# Patient Record
Sex: FEMALE | Race: WHITE | NOT HISPANIC OR LATINO | Employment: FULL TIME | URBAN - NONMETROPOLITAN AREA
[De-identification: names, ages, dates, MRNs, and addresses within clinical notes are randomized per-mention and may not be internally consistent; named-entity substitution may affect disease eponyms.]

---

## 2018-05-25 ENCOUNTER — TRANSCRIBE ORDERS (OUTPATIENT)
Dept: ADMINISTRATIVE | Facility: HOSPITAL | Age: 33
End: 2018-05-25

## 2018-05-25 DIAGNOSIS — N64.4 BREAST PAIN, LEFT: Primary | ICD-10-CM

## 2018-06-05 ENCOUNTER — APPOINTMENT (OUTPATIENT)
Dept: MAMMOGRAPHY | Facility: HOSPITAL | Age: 33
End: 2018-06-05

## 2018-06-11 ENCOUNTER — HOSPITAL ENCOUNTER (OUTPATIENT)
Dept: MAMMOGRAPHY | Facility: HOSPITAL | Age: 33
Discharge: HOME | End: 2018-06-11
Admitting: OBSTETRICS & GYNECOLOGY

## 2018-06-11 DIAGNOSIS — N64.4 BREAST PAIN, LEFT: ICD-10-CM

## 2018-06-11 PROCEDURE — G0279 TOMOSYNTHESIS, MAMMO: HCPCS

## 2018-06-11 PROCEDURE — 77066 DX MAMMO INCL CAD BI: CPT

## 2019-01-14 ENCOUNTER — TRANSCRIBE ORDERS (OUTPATIENT)
Dept: ADMINISTRATIVE | Facility: HOSPITAL | Age: 34
End: 2019-01-14

## 2019-01-14 DIAGNOSIS — Z09 FOLLOW-UP EXAM, 3-6 MONTHS SINCE PREVIOUS EXAM: Primary | ICD-10-CM

## 2019-01-22 ENCOUNTER — APPOINTMENT (OUTPATIENT)
Dept: MAMMOGRAPHY | Facility: HOSPITAL | Age: 34
End: 2019-01-22

## 2019-04-19 ENCOUNTER — HOSPITAL ENCOUNTER (OUTPATIENT)
Dept: MAMMOGRAPHY | Facility: HOSPITAL | Age: 34
Discharge: HOME | End: 2019-04-19
Admitting: OBSTETRICS & GYNECOLOGY

## 2019-04-19 DIAGNOSIS — R92.8 FOLLOW-UP EXAMINATION OF ABNORMAL MAMMOGRAM: ICD-10-CM

## 2019-04-19 PROCEDURE — G0279 TOMOSYNTHESIS, MAMMO: HCPCS

## 2019-04-19 PROCEDURE — 77065 DX MAMMO INCL CAD UNI: CPT

## 2020-01-21 ENCOUNTER — OFFICE VISIT (OUTPATIENT)
Dept: RETAIL CLINIC | Facility: CLINIC | Age: 35
End: 2020-01-21

## 2020-01-21 VITALS
TEMPERATURE: 101.3 F | SYSTOLIC BLOOD PRESSURE: 118 MMHG | RESPIRATION RATE: 16 BRPM | DIASTOLIC BLOOD PRESSURE: 82 MMHG | HEART RATE: 116 BPM | WEIGHT: 109 LBS | OXYGEN SATURATION: 98 %

## 2020-01-21 DIAGNOSIS — J11.1 INFLUENZA-LIKE ILLNESS: Primary | ICD-10-CM

## 2020-01-21 LAB
EXPIRATION DATE: NORMAL
FLUAV AG NPH QL: NEGATIVE
FLUBV AG NPH QL: NEGATIVE
INTERNAL CONTROL: NORMAL
Lab: NORMAL

## 2020-01-21 PROCEDURE — 87804 INFLUENZA ASSAY W/OPTIC: CPT | Performed by: NURSE PRACTITIONER

## 2020-01-21 PROCEDURE — 99203 OFFICE O/P NEW LOW 30 MIN: CPT | Performed by: NURSE PRACTITIONER

## 2020-01-21 RX ORDER — OSELTAMIVIR PHOSPHATE 75 MG/1
75 CAPSULE ORAL 2 TIMES DAILY
Qty: 10 CAPSULE | Refills: 0 | Status: SHIPPED | OUTPATIENT
Start: 2020-01-21 | End: 2020-01-26

## 2020-01-21 NOTE — PROGRESS NOTES
Subjective   Dwayne Saucedo is a 34 y.o. female.     URI    This is a new problem. The current episode started yesterday. The problem has been unchanged. Maximum temperature: up to 101.5. Associated symptoms include congestion, coughing, headaches, nausea, rhinorrhea and a sore throat. Pertinent negatives include no diarrhea, ear pain or vomiting. She has tried nothing for the symptoms.        No current outpatient medications on file prior to visit.     No current facility-administered medications on file prior to visit.        Allergies   Allergen Reactions   • Contrast Dye Hives   • Sumatriptan Other (See Comments)     Chest pain       Past Medical History:   Diagnosis Date   • Migraines        Past Surgical History:   Procedure Laterality Date   • ENDOMETRIAL BIOPSY     • TONSILLECTOMY         History reviewed. No pertinent family history.    Social History     Socioeconomic History   • Marital status:      Spouse name: Not on file   • Number of children: Not on file   • Years of education: Not on file   • Highest education level: Not on file   Tobacco Use   • Smoking status: Never Smoker   • Smokeless tobacco: Never Used   Substance and Sexual Activity   • Alcohol use: Never     Frequency: Never   • Drug use: Never   • Sexual activity: Defer       Review of Systems   Constitutional: Positive for activity change, appetite change, chills, diaphoresis and fever.   HENT: Positive for congestion, rhinorrhea and sore throat. Negative for ear pain.    Respiratory: Positive for cough.    Gastrointestinal: Positive for nausea. Negative for diarrhea and vomiting.   Neurological: Positive for headaches.       /82   Pulse 116   Temp (!) 101.3 °F (38.5 °C)   Resp 16   Wt 49.4 kg (109 lb)   SpO2 98%   Breastfeeding No     Objective   Physical Exam   Constitutional: She is oriented to person, place, and time. She appears well-developed and well-nourished. She is cooperative.   HENT:   Head:  Normocephalic.   Right Ear: Tympanic membrane, external ear and ear canal normal.   Left Ear: Tympanic membrane, external ear and ear canal normal.   Nose: Rhinorrhea present. Right sinus exhibits no maxillary sinus tenderness and no frontal sinus tenderness. Left sinus exhibits no maxillary sinus tenderness and no frontal sinus tenderness.   Mouth/Throat: Uvula is midline, oropharynx is clear and moist and mucous membranes are normal. Tonsils are 0 on the right. Tonsils are 0 on the left.   Eyes: Conjunctivae, EOM and lids are normal.   Cardiovascular: Regular rhythm and normal heart sounds. Tachycardia present.   Pulmonary/Chest: Effort normal and breath sounds normal. No accessory muscle usage. No respiratory distress.   Lymphadenopathy:     She has no cervical adenopathy.   Neurological: She is alert and oriented to person, place, and time.   Skin: Skin is warm, dry and intact.   Psychiatric: She has a normal mood and affect. Her speech is normal and behavior is normal.         Assessment/Plan   Diagnoses and all orders for this visit:    Influenza-like illness  -     POCT Influenza A/B  -     oseltamivir (TAMIFLU) 75 MG capsule; Take 1 capsule by mouth 2 (Two) Times a Day for 5 days.        Results for orders placed or performed in visit on 01/21/20   POCT Influenza A/B   Result Value Ref Range    Rapid Influenza A Ag Negative Negative    Rapid Influenza B Ag Negative Negative    Internal Control Passed Passed    Lot Number [id number]     Expiration Date 6/21      Patient declined anti-pyretic in clinic. States she will take at home.   Alternate tylenol/motrin for pain/fever. Increase fluids. Rest.   Follow up with PCP or go to the nearest emergency room if symptoms worsen or fail to improve.

## 2020-12-29 ENCOUNTER — APPOINTMENT (OUTPATIENT)
Dept: CT IMAGING | Facility: HOSPITAL | Age: 35
End: 2020-12-29

## 2020-12-29 ENCOUNTER — HOSPITAL ENCOUNTER (EMERGENCY)
Facility: HOSPITAL | Age: 35
Discharge: HOME | End: 2020-12-29
Attending: EMERGENCY MEDICINE | Admitting: EMERGENCY MEDICINE

## 2020-12-29 ENCOUNTER — APPOINTMENT (OUTPATIENT)
Dept: GENERAL RADIOLOGY | Facility: HOSPITAL | Age: 35
End: 2020-12-29

## 2020-12-29 VITALS
BODY MASS INDEX: 20.06 KG/M2 | OXYGEN SATURATION: 97 % | HEIGHT: 63 IN | HEART RATE: 72 BPM | SYSTOLIC BLOOD PRESSURE: 105 MMHG | WEIGHT: 113.2 LBS | DIASTOLIC BLOOD PRESSURE: 60 MMHG | TEMPERATURE: 98.6 F | RESPIRATION RATE: 17 BRPM

## 2020-12-29 DIAGNOSIS — R10.84 GENERALIZED ABDOMINAL PAIN: Primary | ICD-10-CM

## 2020-12-29 LAB
ALBUMIN SERPL-MCNC: 4.7 G/DL (ref 3.5–5.2)
ALBUMIN/GLOB SERPL: 1.7 G/DL
ALP SERPL-CCNC: 49 U/L (ref 39–117)
ALT SERPL W P-5'-P-CCNC: 9 U/L (ref 1–33)
ANION GAP SERPL CALCULATED.3IONS-SCNC: 10.2 MMOL/L (ref 5–15)
AST SERPL-CCNC: 18 U/L (ref 1–32)
B-HCG UR QL: NEGATIVE
BACTERIA UR QL AUTO: ABNORMAL /HPF
BASOPHILS # BLD AUTO: 0.04 10*3/MM3 (ref 0–0.2)
BASOPHILS NFR BLD AUTO: 0.7 % (ref 0–1.5)
BILIRUB SERPL-MCNC: 0.6 MG/DL (ref 0–1.2)
BILIRUB UR QL STRIP: NEGATIVE
BUN SERPL-MCNC: 8 MG/DL (ref 6–20)
BUN/CREAT SERPL: 11.8 (ref 7–25)
CALCIUM SPEC-SCNC: 9.4 MG/DL (ref 8.6–10.5)
CHLORIDE SERPL-SCNC: 106 MMOL/L (ref 98–107)
CLARITY UR: ABNORMAL
CO2 SERPL-SCNC: 23.8 MMOL/L (ref 22–29)
COLOR UR: YELLOW
CREAT SERPL-MCNC: 0.68 MG/DL (ref 0.57–1)
DEPRECATED RDW RBC AUTO: 40.5 FL (ref 37–54)
EOSINOPHIL # BLD AUTO: 0.05 10*3/MM3 (ref 0–0.4)
EOSINOPHIL NFR BLD AUTO: 0.9 % (ref 0.3–6.2)
ERYTHROCYTE [DISTWIDTH] IN BLOOD BY AUTOMATED COUNT: 12.3 % (ref 12.3–15.4)
GFR SERPL CREATININE-BSD FRML MDRD: 98 ML/MIN/1.73
GLOBULIN UR ELPH-MCNC: 2.7 GM/DL
GLUCOSE SERPL-MCNC: 89 MG/DL (ref 65–99)
GLUCOSE UR STRIP-MCNC: NEGATIVE MG/DL
HCT VFR BLD AUTO: 40.6 % (ref 34–46.6)
HGB BLD-MCNC: 13.3 G/DL (ref 12–15.9)
HGB UR QL STRIP.AUTO: ABNORMAL
HOLD SPECIMEN: NORMAL
HOLD SPECIMEN: NORMAL
HYALINE CASTS UR QL AUTO: ABNORMAL /LPF
IMM GRANULOCYTES # BLD AUTO: 0.01 10*3/MM3 (ref 0–0.05)
IMM GRANULOCYTES NFR BLD AUTO: 0.2 % (ref 0–0.5)
KETONES UR QL STRIP: NEGATIVE
LEUKOCYTE ESTERASE UR QL STRIP.AUTO: ABNORMAL
LIPASE SERPL-CCNC: 20 U/L (ref 13–60)
LYMPHOCYTES # BLD AUTO: 1.52 10*3/MM3 (ref 0.7–3.1)
LYMPHOCYTES NFR BLD AUTO: 27.4 % (ref 19.6–45.3)
MCH RBC QN AUTO: 29.5 PG (ref 26.6–33)
MCHC RBC AUTO-ENTMCNC: 32.8 G/DL (ref 31.5–35.7)
MCV RBC AUTO: 90 FL (ref 79–97)
MONOCYTES # BLD AUTO: 0.27 10*3/MM3 (ref 0.1–0.9)
MONOCYTES NFR BLD AUTO: 4.9 % (ref 5–12)
NEUTROPHILS NFR BLD AUTO: 3.66 10*3/MM3 (ref 1.7–7)
NEUTROPHILS NFR BLD AUTO: 65.9 % (ref 42.7–76)
NITRITE UR QL STRIP: NEGATIVE
NRBC BLD AUTO-RTO: 0 /100 WBC (ref 0–0.2)
PH UR STRIP.AUTO: 7.5 [PH] (ref 5–8)
PLATELET # BLD AUTO: 153 10*3/MM3 (ref 140–450)
PMV BLD AUTO: 11.1 FL (ref 6–12)
POTASSIUM SERPL-SCNC: 3.6 MMOL/L (ref 3.5–5.2)
PROT SERPL-MCNC: 7.4 G/DL (ref 6–8.5)
PROT UR QL STRIP: NEGATIVE
RBC # BLD AUTO: 4.51 10*6/MM3 (ref 3.77–5.28)
RBC # UR: ABNORMAL /HPF
REF LAB TEST METHOD: ABNORMAL
SODIUM SERPL-SCNC: 140 MMOL/L (ref 136–145)
SP GR UR STRIP: 1.02 (ref 1–1.03)
SQUAMOUS #/AREA URNS HPF: ABNORMAL /HPF
TROPONIN T SERPL-MCNC: <0.01 NG/ML (ref 0–0.03)
UROBILINOGEN UR QL STRIP: ABNORMAL
WBC # BLD AUTO: 5.55 10*3/MM3 (ref 3.4–10.8)
WBC UR QL AUTO: ABNORMAL /HPF
WHOLE BLOOD HOLD SPECIMEN: NORMAL
WHOLE BLOOD HOLD SPECIMEN: NORMAL

## 2020-12-29 PROCEDURE — 80053 COMPREHEN METABOLIC PANEL: CPT

## 2020-12-29 PROCEDURE — 84484 ASSAY OF TROPONIN QUANT: CPT

## 2020-12-29 PROCEDURE — 71046 X-RAY EXAM CHEST 2 VIEWS: CPT

## 2020-12-29 PROCEDURE — 85025 COMPLETE CBC W/AUTO DIFF WBC: CPT

## 2020-12-29 PROCEDURE — 99284 EMERGENCY DEPT VISIT MOD MDM: CPT

## 2020-12-29 PROCEDURE — 81025 URINE PREGNANCY TEST: CPT | Performed by: PHYSICIAN ASSISTANT

## 2020-12-29 PROCEDURE — 81001 URINALYSIS AUTO W/SCOPE: CPT

## 2020-12-29 PROCEDURE — 83690 ASSAY OF LIPASE: CPT

## 2020-12-29 PROCEDURE — 93005 ELECTROCARDIOGRAM TRACING: CPT

## 2020-12-29 PROCEDURE — 96374 THER/PROPH/DIAG INJ IV PUSH: CPT

## 2020-12-29 PROCEDURE — 25010000002 ONDANSETRON PER 1 MG: Performed by: PHYSICIAN ASSISTANT

## 2020-12-29 PROCEDURE — 74176 CT ABD & PELVIS W/O CONTRAST: CPT

## 2020-12-29 RX ORDER — ONDANSETRON 2 MG/ML
4 INJECTION INTRAMUSCULAR; INTRAVENOUS ONCE
Status: COMPLETED | OUTPATIENT
Start: 2020-12-29 | End: 2020-12-29

## 2020-12-29 RX ORDER — ONDANSETRON 4 MG/1
4 TABLET, ORALLY DISINTEGRATING ORAL EVERY 6 HOURS PRN
Qty: 8 TABLET | Refills: 0 | Status: SHIPPED | OUTPATIENT
Start: 2020-12-29 | End: 2020-12-31

## 2020-12-29 RX ORDER — ACETAMINOPHEN 500 MG
500 TABLET ORAL EVERY 6 HOURS PRN
COMMUNITY

## 2020-12-29 RX ORDER — FAMOTIDINE 40 MG/1
40 TABLET, FILM COATED ORAL DAILY
Qty: 14 TABLET | Refills: 0 | Status: SHIPPED | OUTPATIENT
Start: 2020-12-29 | End: 2021-01-12

## 2020-12-29 RX ORDER — SODIUM CHLORIDE 0.9 % (FLUSH) 0.9 %
10 SYRINGE (ML) INJECTION AS NEEDED
Status: DISCONTINUED | OUTPATIENT
Start: 2020-12-29 | End: 2020-12-29 | Stop reason: HOSPADM

## 2020-12-29 RX ADMIN — LIDOCAINE HYDROCHLORIDE: 20 SOLUTION ORAL; TOPICAL at 17:18

## 2020-12-29 RX ADMIN — ONDANSETRON 4 MG: 2 INJECTION INTRAMUSCULAR; INTRAVENOUS at 17:15

## 2020-12-29 RX ADMIN — SODIUM CHLORIDE 1000 ML: 9 INJECTION, SOLUTION INTRAVENOUS at 17:14

## 2023-03-17 ENCOUNTER — LAB (OUTPATIENT)
Dept: LAB | Facility: HOSPITAL | Age: 38
End: 2023-03-17
Payer: COMMERCIAL

## 2023-03-17 DIAGNOSIS — E83.10 DISORDER OF IRON METABOLISM: ICD-10-CM

## 2023-03-17 DIAGNOSIS — Z00.00 ROUTINE GENERAL MEDICAL EXAMINATION AT A HEALTH CARE FACILITY: ICD-10-CM

## 2023-03-17 DIAGNOSIS — E55.9 VITAMIN D DEFICIENCY DISEASE: ICD-10-CM

## 2023-03-17 LAB
ALBUMIN SERPL-MCNC: 4.8 G/DL (ref 3.5–5.2)
ALBUMIN/GLOB SERPL: 2 G/DL
ALP SERPL-CCNC: 55 U/L (ref 39–117)
ALT SERPL W P-5'-P-CCNC: 9 U/L (ref 1–33)
ANION GAP SERPL CALCULATED.3IONS-SCNC: 9 MMOL/L (ref 5–15)
AST SERPL-CCNC: 18 U/L (ref 1–32)
BILIRUB SERPL-MCNC: 0.5 MG/DL (ref 0–1.2)
BUN SERPL-MCNC: 15 MG/DL (ref 6–20)
BUN/CREAT SERPL: 14.2 (ref 7–25)
CALCIUM SPEC-SCNC: 9.3 MG/DL (ref 8.6–10.5)
CHLORIDE SERPL-SCNC: 107 MMOL/L (ref 98–107)
CO2 SERPL-SCNC: 28 MMOL/L (ref 22–29)
CREAT SERPL-MCNC: 1.06 MG/DL (ref 0.57–1)
DEPRECATED RDW RBC AUTO: 39.8 FL (ref 37–54)
EGFRCR SERPLBLD CKD-EPI 2021: 69.1 ML/MIN/1.73
ERYTHROCYTE [DISTWIDTH] IN BLOOD BY AUTOMATED COUNT: 12.2 % (ref 12.3–15.4)
FERRITIN SERPL-MCNC: 29.9 NG/ML (ref 13–150)
GLOBULIN UR ELPH-MCNC: 2.4 GM/DL
GLUCOSE SERPL-MCNC: 96 MG/DL (ref 65–99)
HCT VFR BLD AUTO: 39.6 % (ref 34–46.6)
HGB BLD-MCNC: 12.9 G/DL (ref 12–15.9)
IRON 24H UR-MRATE: 126 MCG/DL (ref 37–145)
IRON SATN MFR SERPL: 31 % (ref 20–50)
MCH RBC QN AUTO: 28.9 PG (ref 26.6–33)
MCHC RBC AUTO-ENTMCNC: 32.6 G/DL (ref 31.5–35.7)
MCV RBC AUTO: 88.6 FL (ref 79–97)
PLATELET # BLD AUTO: 161 10*3/MM3 (ref 140–450)
PMV BLD AUTO: 12.6 FL (ref 6–12)
POTASSIUM SERPL-SCNC: 3.9 MMOL/L (ref 3.5–5.2)
PROT SERPL-MCNC: 7.2 G/DL (ref 6–8.5)
RBC # BLD AUTO: 4.47 10*6/MM3 (ref 3.77–5.28)
SODIUM SERPL-SCNC: 144 MMOL/L (ref 136–145)
TIBC SERPL-MCNC: 402 MCG/DL (ref 298–536)
TRANSFERRIN SERPL-MCNC: 270 MG/DL (ref 200–360)
WBC NRBC COR # BLD: 5.03 10*3/MM3 (ref 3.4–10.8)

## 2023-03-17 PROCEDURE — 36415 COLL VENOUS BLD VENIPUNCTURE: CPT

## 2023-03-17 PROCEDURE — 85027 COMPLETE CBC AUTOMATED: CPT

## 2023-03-17 PROCEDURE — 80053 COMPREHEN METABOLIC PANEL: CPT

## 2023-03-17 PROCEDURE — 81256 HFE GENE: CPT

## 2023-03-17 PROCEDURE — 84466 ASSAY OF TRANSFERRIN: CPT

## 2023-03-17 PROCEDURE — 82607 VITAMIN B-12: CPT

## 2023-03-17 PROCEDURE — 82728 ASSAY OF FERRITIN: CPT

## 2023-03-17 PROCEDURE — 82306 VITAMIN D 25 HYDROXY: CPT

## 2023-03-17 PROCEDURE — 83540 ASSAY OF IRON: CPT

## 2023-03-18 LAB
25(OH)D3 SERPL-MCNC: 16.3 NG/ML (ref 30–100)
VIT B12 BLD-MCNC: 264 PG/ML (ref 211–946)

## 2023-03-22 LAB — HFE GENE MUT ANL BLD/T: NORMAL

## 2023-05-18 ENCOUNTER — HOSPITAL ENCOUNTER (OUTPATIENT)
Dept: GENERAL RADIOLOGY | Facility: HOSPITAL | Age: 38
Discharge: HOME | End: 2023-05-18
Payer: COMMERCIAL

## 2023-05-18 DIAGNOSIS — G89.29 CHRONIC BILATERAL LOW BACK PAIN WITHOUT SCIATICA: ICD-10-CM

## 2023-05-18 DIAGNOSIS — M54.50 CHRONIC BILATERAL LOW BACK PAIN WITHOUT SCIATICA: ICD-10-CM

## 2023-05-18 PROCEDURE — 72114 X-RAY EXAM L-S SPINE BENDING: CPT

## 2023-09-28 ENCOUNTER — APPOINTMENT (OUTPATIENT)
Dept: CT IMAGING | Facility: HOSPITAL | Age: 38
End: 2023-09-28
Payer: COMMERCIAL

## 2023-09-28 ENCOUNTER — HOSPITAL ENCOUNTER (EMERGENCY)
Facility: HOSPITAL | Age: 38
Discharge: HOME | End: 2023-09-28
Attending: EMERGENCY MEDICINE
Payer: COMMERCIAL

## 2023-09-28 VITALS
HEIGHT: 63 IN | RESPIRATION RATE: 17 BRPM | BODY MASS INDEX: 20.2 KG/M2 | OXYGEN SATURATION: 100 % | WEIGHT: 114 LBS | HEART RATE: 72 BPM | DIASTOLIC BLOOD PRESSURE: 84 MMHG | TEMPERATURE: 98 F | SYSTOLIC BLOOD PRESSURE: 120 MMHG

## 2023-09-28 DIAGNOSIS — N23 RENAL COLIC ON RIGHT SIDE: Primary | ICD-10-CM

## 2023-09-28 LAB
ALBUMIN SERPL-MCNC: 4.7 G/DL (ref 3.5–5.2)
ALBUMIN/GLOB SERPL: 2 G/DL
ALP SERPL-CCNC: 59 U/L (ref 39–117)
ALT SERPL W P-5'-P-CCNC: 9 U/L (ref 1–33)
ANION GAP SERPL CALCULATED.3IONS-SCNC: 13.5 MMOL/L (ref 5–15)
AST SERPL-CCNC: 18 U/L (ref 1–32)
B-HCG UR QL: NEGATIVE
BACTERIA UR QL AUTO: ABNORMAL /HPF
BASOPHILS # BLD AUTO: 0.01 10*3/MM3 (ref 0–0.2)
BASOPHILS NFR BLD AUTO: 0.2 % (ref 0–1.5)
BILIRUB SERPL-MCNC: 0.7 MG/DL (ref 0–1.2)
BILIRUB UR QL STRIP: NEGATIVE
BUN SERPL-MCNC: 12 MG/DL (ref 6–20)
BUN/CREAT SERPL: 16.2 (ref 7–25)
CALCIUM SPEC-SCNC: 9.3 MG/DL (ref 8.6–10.5)
CHLORIDE SERPL-SCNC: 103 MMOL/L (ref 98–107)
CLARITY UR: ABNORMAL
CO2 SERPL-SCNC: 22.5 MMOL/L (ref 22–29)
COLOR UR: YELLOW
CREAT SERPL-MCNC: 0.74 MG/DL (ref 0.57–1)
DEPRECATED RDW RBC AUTO: 39.4 FL (ref 37–54)
EGFRCR SERPLBLD CKD-EPI 2021: 106.4 ML/MIN/1.73
EOSINOPHIL # BLD AUTO: 0.07 10*3/MM3 (ref 0–0.4)
EOSINOPHIL NFR BLD AUTO: 1.4 % (ref 0.3–6.2)
ERYTHROCYTE [DISTWIDTH] IN BLOOD BY AUTOMATED COUNT: 12.3 % (ref 12.3–15.4)
GLOBULIN UR ELPH-MCNC: 2.3 GM/DL
GLUCOSE SERPL-MCNC: 93 MG/DL (ref 65–99)
GLUCOSE UR STRIP-MCNC: NEGATIVE MG/DL
HCT VFR BLD AUTO: 38.1 % (ref 34–46.6)
HGB BLD-MCNC: 12.8 G/DL (ref 12–15.9)
HGB UR QL STRIP.AUTO: ABNORMAL
HOLD SPECIMEN: NORMAL
HOLD SPECIMEN: NORMAL
HYALINE CASTS UR QL AUTO: ABNORMAL /LPF
IMM GRANULOCYTES # BLD AUTO: 0.01 10*3/MM3 (ref 0–0.05)
IMM GRANULOCYTES NFR BLD AUTO: 0.2 % (ref 0–0.5)
KETONES UR QL STRIP: NEGATIVE
LEUKOCYTE ESTERASE UR QL STRIP.AUTO: ABNORMAL
LIPASE SERPL-CCNC: 20 U/L (ref 13–60)
LYMPHOCYTES # BLD AUTO: 0.88 10*3/MM3 (ref 0.7–3.1)
LYMPHOCYTES NFR BLD AUTO: 17.4 % (ref 19.6–45.3)
MCH RBC QN AUTO: 29.3 PG (ref 26.6–33)
MCHC RBC AUTO-ENTMCNC: 33.6 G/DL (ref 31.5–35.7)
MCV RBC AUTO: 87.2 FL (ref 79–97)
MONOCYTES # BLD AUTO: 0.23 10*3/MM3 (ref 0.1–0.9)
MONOCYTES NFR BLD AUTO: 4.6 % (ref 5–12)
MUCOUS THREADS URNS QL MICRO: ABNORMAL /HPF
NEUTROPHILS NFR BLD AUTO: 3.85 10*3/MM3 (ref 1.7–7)
NEUTROPHILS NFR BLD AUTO: 76.2 % (ref 42.7–76)
NITRITE UR QL STRIP: NEGATIVE
NRBC BLD AUTO-RTO: 0 /100 WBC (ref 0–0.2)
PH UR STRIP.AUTO: 7 [PH] (ref 5–8)
PLATELET # BLD AUTO: 132 10*3/MM3 (ref 140–450)
PMV BLD AUTO: 11.2 FL (ref 6–12)
POTASSIUM SERPL-SCNC: 3.7 MMOL/L (ref 3.5–5.2)
PROT SERPL-MCNC: 7 G/DL (ref 6–8.5)
PROT UR QL STRIP: ABNORMAL
RBC # BLD AUTO: 4.37 10*6/MM3 (ref 3.77–5.28)
RBC # UR STRIP: ABNORMAL /HPF
REF LAB TEST METHOD: ABNORMAL
SODIUM SERPL-SCNC: 139 MMOL/L (ref 136–145)
SP GR UR STRIP: 1.02 (ref 1–1.03)
SQUAMOUS #/AREA URNS HPF: ABNORMAL /HPF
UROBILINOGEN UR QL STRIP: ABNORMAL
WBC # UR STRIP: ABNORMAL /HPF
WBC NRBC COR # BLD: 5.05 10*3/MM3 (ref 3.4–10.8)
WHOLE BLOOD HOLD COAG: NORMAL
WHOLE BLOOD HOLD SPECIMEN: NORMAL

## 2023-09-28 PROCEDURE — 81025 URINE PREGNANCY TEST: CPT | Performed by: EMERGENCY MEDICINE

## 2023-09-28 PROCEDURE — 83690 ASSAY OF LIPASE: CPT | Performed by: EMERGENCY MEDICINE

## 2023-09-28 PROCEDURE — 80053 COMPREHEN METABOLIC PANEL: CPT | Performed by: EMERGENCY MEDICINE

## 2023-09-28 PROCEDURE — 85025 COMPLETE CBC W/AUTO DIFF WBC: CPT | Performed by: EMERGENCY MEDICINE

## 2023-09-28 PROCEDURE — 25010000002 KETOROLAC TROMETHAMINE PER 15 MG: Performed by: EMERGENCY MEDICINE

## 2023-09-28 PROCEDURE — 51798 US URINE CAPACITY MEASURE: CPT

## 2023-09-28 PROCEDURE — 74176 CT ABD & PELVIS W/O CONTRAST: CPT

## 2023-09-28 PROCEDURE — 99284 EMERGENCY DEPT VISIT MOD MDM: CPT

## 2023-09-28 PROCEDURE — 81001 URINALYSIS AUTO W/SCOPE: CPT | Performed by: EMERGENCY MEDICINE

## 2023-09-28 PROCEDURE — 96374 THER/PROPH/DIAG INJ IV PUSH: CPT

## 2023-09-28 RX ORDER — ONDANSETRON 4 MG/1
4 TABLET, ORALLY DISINTEGRATING ORAL EVERY 8 HOURS PRN
Qty: 12 TABLET | Refills: 0 | Status: SHIPPED | OUTPATIENT
Start: 2023-09-28

## 2023-09-28 RX ORDER — PHENAZOPYRIDINE HYDROCHLORIDE 100 MG/1
200 TABLET, FILM COATED ORAL ONCE
Status: COMPLETED | OUTPATIENT
Start: 2023-09-28 | End: 2023-09-28

## 2023-09-28 RX ORDER — SODIUM CHLORIDE 0.9 % (FLUSH) 0.9 %
10 SYRINGE (ML) INJECTION AS NEEDED
Status: DISCONTINUED | OUTPATIENT
Start: 2023-09-28 | End: 2023-09-28 | Stop reason: HOSPADM

## 2023-09-28 RX ORDER — KETOROLAC TROMETHAMINE 10 MG/1
10 TABLET, FILM COATED ORAL EVERY 6 HOURS PRN
Qty: 20 TABLET | Refills: 0 | Status: SHIPPED | OUTPATIENT
Start: 2023-09-28

## 2023-09-28 RX ORDER — TAMSULOSIN HYDROCHLORIDE 0.4 MG/1
1 CAPSULE ORAL NIGHTLY
Qty: 10 CAPSULE | Refills: 0 | Status: SHIPPED | OUTPATIENT
Start: 2023-09-28 | End: 2023-10-10

## 2023-09-28 RX ORDER — KETOROLAC TROMETHAMINE 30 MG/ML
30 INJECTION, SOLUTION INTRAMUSCULAR; INTRAVENOUS ONCE
Status: COMPLETED | OUTPATIENT
Start: 2023-09-28 | End: 2023-09-28

## 2023-09-28 RX ORDER — OXYCODONE HYDROCHLORIDE AND ACETAMINOPHEN 5; 325 MG/1; MG/1
1 TABLET ORAL EVERY 6 HOURS PRN
Qty: 12 TABLET | Refills: 0 | Status: SHIPPED | OUTPATIENT
Start: 2023-09-28

## 2023-09-28 RX ADMIN — KETOROLAC TROMETHAMINE 30 MG: 30 INJECTION, SOLUTION INTRAMUSCULAR; INTRAVENOUS at 10:45

## 2023-09-28 RX ADMIN — PHENAZOPYRIDINE HYDROCHLORIDE 200 MG: 100 TABLET, FILM COATED ORAL at 10:45

## 2023-09-29 ENCOUNTER — TELEPHONE (OUTPATIENT)
Dept: UROLOGY | Facility: CLINIC | Age: 38
End: 2023-09-29

## 2023-09-29 NOTE — TELEPHONE ENCOUNTER
Provider: PENDING REFERRAL     Caller: Dwayne Saucedo    Relationship to Patient: Self     Phone Number: [phone]    Reason for Call: MEDICAL CONCERN    When was the patient last seen: NEW PATIENT    Notes: MS. SAUCEDO CALLED TO REPORT THAT SHE IS NOT PRODUCING AS MUCH URINE AS SHE BELIEVES SHE SHOULD. PATIENT WANTS TO KNOW IF SHE SHOULD LIMIT HER DRINKS.     PLEASE CALL MS. SAUCEDO TO DISCUSS.     ---UNABLE TO WARM TRANSFER.

## 2023-10-03 ENCOUNTER — PATIENT ROUNDING (BHMG ONLY) (OUTPATIENT)
Dept: UROLOGY | Facility: CLINIC | Age: 38
End: 2023-10-03
Payer: COMMERCIAL

## 2023-10-03 ENCOUNTER — OFFICE VISIT (OUTPATIENT)
Dept: UROLOGY | Facility: CLINIC | Age: 38
End: 2023-10-03
Payer: COMMERCIAL

## 2023-10-03 VITALS
DIASTOLIC BLOOD PRESSURE: 86 MMHG | HEIGHT: 63 IN | SYSTOLIC BLOOD PRESSURE: 124 MMHG | BODY MASS INDEX: 20.2 KG/M2 | OXYGEN SATURATION: 96 % | TEMPERATURE: 99.1 F | WEIGHT: 114 LBS | HEART RATE: 75 BPM

## 2023-10-03 DIAGNOSIS — N20.0 KIDNEY STONE: Primary | ICD-10-CM

## 2023-10-03 LAB
BILIRUB BLD-MCNC: NEGATIVE MG/DL
CLARITY, POC: CLEAR
COLOR UR: YELLOW
EXPIRATION DATE: ABNORMAL
GLUCOSE UR STRIP-MCNC: NEGATIVE MG/DL
KETONES UR QL: NEGATIVE
LEUKOCYTE EST, POC: NEGATIVE
Lab: ABNORMAL
NITRITE UR-MCNC: NEGATIVE MG/ML
PH UR: 7 [PH] (ref 5–8)
PROT UR STRIP-MCNC: ABNORMAL MG/DL
RBC # UR STRIP: NEGATIVE /UL
SP GR UR: 1.01 (ref 1–1.03)
UROBILINOGEN UR QL: NORMAL

## 2023-10-03 PROCEDURE — 99204 OFFICE O/P NEW MOD 45 MIN: CPT | Performed by: NURSE PRACTITIONER

## 2023-10-03 PROCEDURE — 81003 URINALYSIS AUTO W/O SCOPE: CPT | Performed by: NURSE PRACTITIONER

## 2023-10-03 RX ORDER — ACETAMINOPHEN 500 MG
500 TABLET ORAL EVERY 6 HOURS PRN
Qty: 56 TABLET | Refills: 0 | Status: SHIPPED | OUTPATIENT
Start: 2023-10-03 | End: 2023-10-17

## 2023-10-03 NOTE — PROGRESS NOTES
Office Visit New Stone     Patient Name: Dwayne Saucedo  : 1985   MRN: [id number]     Chief Complaint: Kidney Stones.    Chief Complaint   Patient presents with    Kidney Stone     New patient       Referring Provider: Martin Silver DO    History of Present Illness: Dwayne Saucedo is a 37 y.o. female who presents today for further management of nephrolithiasis.  female presented to ER and was diagnosed with a 10 mm stone. female is managing symptoms today. No fever, chills, nausea, vomiting, hematuria, flank pain, dysuria currently but will sometimes come in waves  Has been having flank pain since 9/15 that worsened and sent her to the ER on     Stone prevention medications: No    Stone related history  Family history of stones:   no  Renal disease or anatomic abnormality: no  Malabsorptive disease or gastric bypass: yes  Frequent UTI's    yes  Parathyroid disease    no    Dietary Considerations  Soda - 0-1 per day  Fast food - 1-2 per week  Water - 7-8 glasses per day  No Adds salt to foods    Subjective      Review of System:   As noted in HPI      Past Medical History:   Past Medical History:   Diagnosis Date    Migraines        Past Surgical History:   Past Surgical History:   Procedure Laterality Date    ENDOMETRIAL BIOPSY      TONSILLECTOMY         Family History: History reviewed. No pertinent family history.    Social History:   Social History     Socioeconomic History    Marital status:    Tobacco Use    Smoking status: Never    Smokeless tobacco: Never   Vaping Use    Vaping Use: Never used   Substance and Sexual Activity    Alcohol use: Never    Drug use: Never    Sexual activity: Defer       Medications:     Current Outpatient Medications:     tamsulosin (FLOMAX) 0.4 MG capsule 24 hr capsule, Take 1 capsule by mouth Every Night., Disp: 10 capsule, Rfl: 0    ketorolac (TORADOL) 10 MG tablet, Take 1 tablet by mouth Every 6 (Six) Hours As Needed for Moderate Pain.  "(Patient not taking: Reported on 10/3/2023), Disp: 20 tablet, Rfl: 0    ondansetron ODT (ZOFRAN-ODT) 4 MG disintegrating tablet, Place 1 tablet on the tongue Every 8 (Eight) Hours As Needed for Nausea. (Patient not taking: Reported on 10/3/2023), Disp: 12 tablet, Rfl: 0    oxyCODONE-acetaminophen (PERCOCET) 5-325 MG per tablet, Take 1 tablet by mouth Every 6 (Six) Hours As Needed for Severe Pain. (Patient not taking: Reported on 10/3/2023), Disp: 12 tablet, Rfl: 0    Allergies:   Allergies   Allergen Reactions    Contrast Dye (Echo Or Unknown Ct/Mr) Hives    Sumatriptan Other (See Comments)     Chest pain       Objective     Physical Exam:   Vital Signs:   Vitals:    10/03/23 1036   BP: 124/86   BP Location: Left arm   Patient Position: Sitting   Cuff Size: Adult   Pulse: 75   Temp: 99.1 °F (37.3 °C)   TempSrc: Temporal   SpO2: 96%   Weight: 51.7 kg (114 lb)   Height: 160 cm (63\")   PainSc:   4   PainLoc: Back     Body mass index is 20.19 kg/m².     Physical Exam  Constitutional: NAD, WDWN.   Neurological: A + O x 3    Psychiatric:  Normal mood and affect      Labs  Lab Results   Component Value Date    GLUCOSE 93 09/28/2023    BUN 12 09/28/2023    CREATININE 0.74 09/28/2023    EGFRIFNONA 98 12/29/2020    BCR 16.2 09/28/2023    K 3.7 09/28/2023    CO2 22.5 09/28/2023    CALCIUM 9.3 09/28/2023    ALBUMIN 4.7 09/28/2023    AST 18 09/28/2023    ALT 9 09/28/2023       Lab Results   Component Value Date    WBC 5.05 09/28/2023    HGB 12.8 09/28/2023    HCT 38.1 09/28/2023    MCV 87.2 09/28/2023     (L) 09/28/2023       No results found for: LDH, URICACID    Lab Results   Component Value Date    CALCIUM 9.3 09/28/2023       Brief Urine Lab Results  (Last result in the past 365 days)        Color   Clarity   Blood   Leuk Est   Nitrite   Protein   CREAT   Urine HCG        10/03/23 1040 Yellow   Clear   Negative   Negative   Negative   Trace                   No results found for: URINECX    )No components found for: " STONEANALYSI      Radiologic Studies  CT Abdomen Pelvis Without Contrast    Result Date: 9/24/2023  1. There is a 4.5 mm stone in the lower pole of the right kidney. There is no hydronephrosis. There are no stones in the left kidney. 2. There is increased gas and stool in the colon with right side constipation. 3. The appendix is not visualized with confidence. There are no secondary findings of appendicitis.    XR Chest 1 View    Result Date: 9/24/2023  No acute cardiopulmonary process Images reviewed, interpreted, and dictated by Dr. Grady Peña. Transcribed by Greg Modi.    CT Abdomen Pelvis Stone Protocol    Result Date: 9/28/2023  Moderate right hydronephrosis secondary to a 10 mm right distal ureteral stone.  Right nephrolithiasis.   CTDI: 6.71 mGy DLP:309.13 mGy.cm  This report was signed and finalized on 9/28/2023 11:23 AM by Alison Goddard MD.        I have personally reviewed these labs and images.    Assessment / Plan      Assessment  Ms. Saucedo is a 37 y.o. female with 10 mm right distal ureteral stone and a smaller 4.5 mm stone in the lower pole of the right kidney.  UA today is negative for infection.  Patient did not  narcotic pain medicine at pharmacy and reports that she does not want to have that medication in her house.  We discussed managing pain prophylactically with alternating Tylenol and diclofenac she is in agreement and I will send prescriptions.    We had informed discussion about the causes of stones, in the main treatments for nephrolithiasis.  The 3 main surgical treatments for stones are percutaneous nephrolithotomy, ureteroscopy and laser lithotripsy, and shockwave lithotripsy.  Based on patient factors and the stone size and location, I have recommended right URS/LL.  We discussed the risks, benefits, and alternatives to this therapy.  The main risks that we discussed were bleeding, urinary infection, damage to nearby structures, need for further procedures, and cardiopulmonary  complications from anesthesia.  The patient voiced understanding and wished to proceed.     Patient is advised if pain becomes unmanageable, she develops fever, nausea or vomiting to return to the ER    Plan  1. Schedule for Right URS/HLL stent at [facility] with Dr. Hdz  2.  Tylenol and diclofenac to manage pain  3.  Continue Flomax daily    Surgical concerns: Suspected Chrohn's disease      LEEANNA Salazar, NP-C  [facility] [place]    Terbinafine Pregnancy And Lactation Text: This medication is Pregnancy Category B and is considered safe during pregnancy. It is also excreted in breast milk and breast feeding isn't recommended.

## 2023-10-06 NOTE — PROGRESS NOTES
October 6, 2023    Hello, may I speak with Dwayne Saucedo? Unable to reach patient.    My name is Valentine.      I am  with [facility]  [address]  [address]  [facility] [address] [phone].    Before we get started may I verify your date of birth? 1985    I am calling to officially welcome you to our practice and ask about your recent visit. Is this a good time to talk?     Tell me about your visit with us. What things went well?         We're always looking for ways to make our patients' experiences even better. Do you have recommendations on ways we may improve?      Overall were you satisfied with your first visit to our practice?        I appreciate you taking the time to speak with me today. Is there anything else I can do for you?       Thank you, and have a great day.

## 2023-10-10 ENCOUNTER — LAB REQUISITION (OUTPATIENT)
Dept: LAB | Facility: HOSPITAL | Age: 38
End: 2023-10-10
Payer: COMMERCIAL

## 2023-10-10 ENCOUNTER — OUTSIDE FACILITY SERVICE (OUTPATIENT)
Dept: UROLOGY | Facility: CLINIC | Age: 38
End: 2023-10-10
Payer: COMMERCIAL

## 2023-10-10 DIAGNOSIS — N20.0 KIDNEY STONE: Primary | ICD-10-CM

## 2023-10-10 PROCEDURE — 82365 CALCULUS SPECTROSCOPY: CPT | Performed by: UROLOGY

## 2023-10-10 RX ORDER — CIPROFLOXACIN 500 MG/1
500 TABLET, FILM COATED ORAL 2 TIMES DAILY
Qty: 6 TABLET | Refills: 0 | Status: SHIPPED | OUTPATIENT
Start: 2023-10-10 | End: 2023-10-13 | Stop reason: SDUPTHER

## 2023-10-10 RX ORDER — OXYCODONE HYDROCHLORIDE 5 MG/1
5 TABLET ORAL EVERY 6 HOURS PRN
Qty: 5 TABLET | Refills: 0 | Status: SHIPPED | OUTPATIENT
Start: 2023-10-10

## 2023-10-10 RX ORDER — ACETAMINOPHEN 500 MG
1000 TABLET ORAL EVERY 6 HOURS
Qty: 30 TABLET | Refills: 0 | Status: SHIPPED | OUTPATIENT
Start: 2023-10-10 | End: 2023-10-13

## 2023-10-10 RX ORDER — DOCUSATE SODIUM 100 MG/1
100 CAPSULE, LIQUID FILLED ORAL 2 TIMES DAILY
Qty: 15 CAPSULE | Refills: 1 | Status: SHIPPED | OUTPATIENT
Start: 2023-10-10

## 2023-10-10 RX ORDER — TAMSULOSIN HYDROCHLORIDE 0.4 MG/1
1 CAPSULE ORAL NIGHTLY
Qty: 10 CAPSULE | Refills: 0 | Status: SHIPPED | OUTPATIENT
Start: 2023-10-10

## 2023-10-10 RX ORDER — OXYBUTYNIN CHLORIDE 10 MG/1
10 TABLET, EXTENDED RELEASE ORAL DAILY PRN
Qty: 10 TABLET | Refills: 0 | Status: SHIPPED | OUTPATIENT
Start: 2023-10-10

## 2023-10-10 RX ORDER — PHENAZOPYRIDINE HYDROCHLORIDE 100 MG/1
100 TABLET, FILM COATED ORAL DAILY PRN
Qty: 7 TABLET | Refills: 0 | Status: SHIPPED | OUTPATIENT
Start: 2023-10-10

## 2023-10-11 NOTE — ED PROVIDER NOTES
"Subjective  History of Present Illness:    Chief Complaint: Flank pain    History of Present Illness: 37-year-old female right flank pain right abdomen pain.  Associated hematuria    Nurses Notes reviewed and agree, including vitals, allergies, social history and prior medical history.     REVIEW OF SYSTEMS: All systems reviewed and not pertinent unless noted.  Review of Systems      Positive for: Flank pain hematuria    Negative for: Fever diarrhea    Past Medical History:   Diagnosis Date    Migraines        Allergies:    Contrast dye (echo or unknown ct/mr) and Sumatriptan      Past Surgical History:   Procedure Laterality Date    ENDOMETRIAL BIOPSY      TONSILLECTOMY           Social History     Socioeconomic History    Marital status:    Tobacco Use    Smoking status: Never    Smokeless tobacco: Never   Vaping Use    Vaping Use: Never used   Substance and Sexual Activity    Alcohol use: Never    Drug use: Never    Sexual activity: Defer         History reviewed. No pertinent family history.    Objective  Physical Exam:  /84 (BP Location: Left arm, Patient Position: Sitting)   Pulse 72   Temp 98 øF (36.7 øC) (Oral)   Resp 17   Ht 160 cm (63\")   Wt 51.7 kg (114 lb)   LMP 09/27/2023 (Exact Date)   SpO2 100%   BMI 20.19 kg/mý      Physical Exam    CONSTITUTIONAL: Well developed, nontoxic 37-year-old female,  in no acute distress.  VITAL SIGNS: per nursing, reviewed and noted  SKIN: exposed skin with no rashes, ulcerations or petechiae  EYES: Grossly EOMI, no icterus  ENT: Normal voice.  Moist mucous membranes   RESPIRATORY:  No increased work of breathing. No retractions.   CARDIOVASCULAR:   Extremities pink and warm.  Good cap refill to extremities.   GI: Abdomen without distention   MUSCULOSKELETAL: Age-appropriate bulk and tone, moves all 4 extremities  NEUROLOGIC: Alert, oriented x 3. No gross deficits. GCS 15.   PSYCH: appropriate affect.  : no bladder tenderness or distention, mild " right CVA tenderness    Procedures    ED Course:    Lab Results (last 24 hours)       Procedure Component Value Units Date/Time    STONE ANALYSIS - Calculus, [[id number]] Collected: 10/10/23 1242    Specimen: Calculus Updated: 10/10/23 1720             No radiology results from the last 24 hrs     MDM     Amount and/or Complexity of Data Reviewed  Clinical lab tests: reviewed  Tests in the radiology section of CPTr: reviewed             Medical Decision Making:    Initial impression of presenting illness: 37-year-old female right flank pain right abdomen pain.  Associated hematuria    DDX: includes but is not limited to: Renal colic pyelonephritis, among others         Patient arrives normotensive afebrile no tachycardia sats 100% with vitals interpreted by myself.     Pertinent features from physical exam: Right CVA tenderness.    Initial diagnostic plan: UA CBC CMP lipase hCG CT    Results from initial plan were reviewed and interpreted by me revealing UA with large blood trace bacteria 3-5 white cells trace leukocytes nonactionable remainder labs, CT reveals right-sided hydronephrosis with a 1 cm distal ureteral stone    Diagnostic information from other sources: Old record review, [place]    Interventions / Re-evaluation: Toradol, Pyridium, and emergency department.  Prescription Toradol Zofran and Percocet    Results/clinical rationale were discussed with patient    Consultations/Discussion of results with other physicians: None    Disposition plan: Patient was discharged in home stable condition.  Counseled on supportive care, outpatient follow-up. Return precaution discussed.  Patient/family was understanding and agreeable with plan  Urology referral  -----      Final diagnoses:   Renal colic on right side            Martin Silver,   10/11/23 0301

## 2023-10-12 ENCOUNTER — TELEPHONE (OUTPATIENT)
Dept: UROLOGY | Facility: CLINIC | Age: 38
End: 2023-10-12

## 2023-10-12 NOTE — TELEPHONE ENCOUNTER
Hub staff attempted to follow warm transfer process and was unsuccessful     Caller: SUZY RAMOS    Relationship to patient: SELF    Best call back number: [phone] (home)       Patient is needing: PAT HAD SURG FOR KIDNEY STONES ON 10/10/23 AND IS NOW HAVING SPASMS, BLOOD IN URINE, BLOOD CLOTS, SNEEZED AND THINKS MIGHT HAVE IRRITATED STENT. PLEASE CALL BACK TO DISCUSS. THANK YOU.

## 2023-10-13 ENCOUNTER — HOSPITAL ENCOUNTER (OUTPATIENT)
Dept: CT IMAGING | Facility: HOSPITAL | Age: 38
Discharge: HOME | End: 2023-10-13
Admitting: NURSE PRACTITIONER
Payer: COMMERCIAL

## 2023-10-13 ENCOUNTER — OFFICE VISIT (OUTPATIENT)
Dept: UROLOGY | Facility: CLINIC | Age: 38
End: 2023-10-13
Payer: COMMERCIAL

## 2023-10-13 VITALS
SYSTOLIC BLOOD PRESSURE: 124 MMHG | TEMPERATURE: 99.4 F | DIASTOLIC BLOOD PRESSURE: 76 MMHG | WEIGHT: 114 LBS | BODY MASS INDEX: 20.2 KG/M2 | HEIGHT: 63 IN

## 2023-10-13 DIAGNOSIS — N20.0 KIDNEY STONE: ICD-10-CM

## 2023-10-13 DIAGNOSIS — N20.0 KIDNEY STONE: Primary | ICD-10-CM

## 2023-10-13 LAB
BILIRUB BLD-MCNC: NEGATIVE MG/DL
CLARITY, POC: ABNORMAL
COLOR UR: ABNORMAL
EXPIRATION DATE: ABNORMAL
GLUCOSE UR STRIP-MCNC: NEGATIVE MG/DL
KETONES UR QL: NEGATIVE
LEUKOCYTE EST, POC: ABNORMAL
Lab: ABNORMAL
NITRITE UR-MCNC: POSITIVE MG/ML
PH UR: 7.5 [PH] (ref 5–8)
PROT UR STRIP-MCNC: ABNORMAL MG/DL
RBC # UR STRIP: ABNORMAL /UL
SP GR UR: 1.01 (ref 1–1.03)
UROBILINOGEN UR QL: NORMAL

## 2023-10-13 PROCEDURE — 74176 CT ABD & PELVIS W/O CONTRAST: CPT

## 2023-10-13 RX ORDER — CIPROFLOXACIN 500 MG/1
500 TABLET, FILM COATED ORAL 2 TIMES DAILY
Qty: 8 TABLET | Refills: 0 | Status: SHIPPED | OUTPATIENT
Start: 2023-10-13 | End: 2023-10-17

## 2023-10-13 NOTE — PROGRESS NOTES
Office Visit General Established Female Patient     Patient Name: Dwayne Saucedo  : 1985   MRN: [id number]     Chief Complaint:   Chief Complaint   Patient presents with    Follow-up     Blood and pain aftetr surgery        Referring Provider: No ref. provider found    History of Present Illness: Dwayne Saucedo is a 37 y.o. female with history below in assessment, who presents for pain and blood after right URS/LL  Patient had procedure done with Dr. Hdz on 10/11/2023 she felt like she sneezed very hard and may have dislodged her stent and 2 days ago had flank pain where she was up at night could not lay had to stand it was so uncomfortable, she feels that her kidney is swollen.  She has seen some blood in her urine and in general is just very uncomfortable and concerned about her flank pain.  She has taken her oxybutynin which seemed to improve her symptoms some, she has taken her antibiotics her pyridium and has also taken her oxycodone which did not touch her discomfort.      Subjective      Review of System:   As noted in HPI     Past Medical History:   Past Medical History:   Diagnosis Date    Migraines        Past Surgical History:   Past Surgical History:   Procedure Laterality Date    ENDOMETRIAL BIOPSY      TONSILLECTOMY         Family History: No family history on file.    Social History:   Social History     Socioeconomic History    Marital status:    Tobacco Use    Smoking status: Never    Smokeless tobacco: Never   Vaping Use    Vaping Use: Never used   Substance and Sexual Activity    Alcohol use: Never    Drug use: Never    Sexual activity: Defer       Medications:     Current Outpatient Medications:     ciprofloxacin (Cipro) 500 MG tablet, Take 1 tablet by mouth 2 (Two) Times a Day for 4 days., Disp: 8 tablet, Rfl: 0    acetaminophen (TYLENOL) 500 MG tablet, Take 1 tablet by mouth Every 6 (Six) Hours As Needed for Mild Pain for up to 14 days., Disp: 56 tablet,  "Rfl: 0    acetaminophen (TYLENOL) 500 MG tablet, Take 2 tablets by mouth Every 6 (Six) Hours for 3 days., Disp: 30 tablet, Rfl: 0    diclofenac (VOLTAREN) 50 MG EC tablet, Take 1 tablet by mouth 2 (Two) Times a Day for 14 days., Disp: 28 tablet, Rfl: 0    docusate sodium (Colace) 100 MG capsule, Take 1 capsule by mouth 2 (Two) Times a Day. If taking pain pill, Disp: 15 capsule, Rfl: 1    ketorolac (TORADOL) 10 MG tablet, Take 1 tablet by mouth Every 6 (Six) Hours As Needed for Moderate Pain. (Patient not taking: Reported on 10/3/2023), Disp: 20 tablet, Rfl: 0    ondansetron ODT (ZOFRAN-ODT) 4 MG disintegrating tablet, Place 1 tablet on the tongue Every 8 (Eight) Hours As Needed for Nausea. (Patient not taking: Reported on 10/3/2023), Disp: 12 tablet, Rfl: 0    oxybutynin XL (Ditropan XL) 10 MG 24 hr tablet, Take 1 tablet by mouth Daily As Needed (bladder spasm)., Disp: 10 tablet, Rfl: 0    oxyCODONE (Roxicodone) 5 MG immediate release tablet, Take 1 tablet by mouth Every 6 (Six) Hours As Needed for Moderate Pain or Severe Pain., Disp: 5 tablet, Rfl: 0    oxyCODONE-acetaminophen (PERCOCET) 5-325 MG per tablet, Take 1 tablet by mouth Every 6 (Six) Hours As Needed for Severe Pain. (Patient not taking: Reported on 10/3/2023), Disp: 12 tablet, Rfl: 0    phenazopyridine (PYRIDIUM) 100 MG tablet, Take 1 tablet by mouth Daily As Needed (urinary burning)., Disp: 7 tablet, Rfl: 0    tamsulosin (FLOMAX) 0.4 MG capsule 24 hr capsule, Take 1 capsule by mouth Every Night., Disp: 10 capsule, Rfl: 0    Allergies:   Allergies   Allergen Reactions    Contrast Dye (Echo Or Unknown Ct/Mr) Hives    Sumatriptan Other (See Comments)     Chest pain       Objective     Physical Exam:   Vital Signs:   Visit Vitals  /76 (BP Location: Left arm, Patient Position: Sitting, Cuff Size: Adult)   Temp 99.4 øF (37.4 øC) (Temporal)   Ht 160 cm (62.99\")   Wt 51.7 kg (114 lb)   LMP 09/27/2023 (Exact Date)   BMI 20.20 kg/mý      Body mass index is " 20.2 kg/mý.     Constitutional: NAD, WDWN.   Neurological: A + O x 3   Psychiatric:  Normal mood and affect    Labs  Brief Urine Lab Results  (Last result in the past 365 days)        Color   Clarity   Blood   Leuk Est   Nitrite   Protein   CREAT   Urine HCG        10/13/23 1406 Red   Cloudy   3+   Moderate (2+)   Positive   2+                   Lab Results   Component Value Date    GLUCOSE 93 09/28/2023    CALCIUM 9.3 09/28/2023     09/28/2023    K 3.7 09/28/2023    CO2 22.5 09/28/2023     09/28/2023    BUN 12 09/28/2023    CREATININE 0.74 09/28/2023    EGFRIFNONA 98 12/29/2020    BCR 16.2 09/28/2023    ANIONGAP 13.5 09/28/2023       Lab Results   Component Value Date    WBC 5.05 09/28/2023    HGB 12.8 09/28/2023    HCT 38.1 09/28/2023    MCV 87.2 09/28/2023     (L) 09/28/2023            Radiographic Studies  CT Abdomen Pelvis Stone Protocol    Result Date: 9/28/2023  Moderate right hydronephrosis secondary to a 10 mm right distal ureteral stone.  Right nephrolithiasis.   CTDI: 6.71 mGy DLP:309.13 mGy.cm  This report was signed and finalized on 9/28/2023 11:23 AM by Alison Goddard MD.      XR Chest 1 View    Result Date: 9/24/2023  No acute cardiopulmonary process Images reviewed, interpreted, and dictated by Dr. Grady Peña. Transcribed by Greg Modi.    CT Abdomen Pelvis Without Contrast    Result Date: 9/24/2023  1. There is a 4.5 mm stone in the lower pole of the right kidney. There is no hydronephrosis. There are no stones in the left kidney. 2. There is increased gas and stool in the colon with right side constipation. 3. The appendix is not visualized with confidence. There are no secondary findings of appendicitis.      I have reviewed the above labs and imaging.     Assessment / Plan      Assessment/Plan:   Diagnoses and all orders for this visit:    1. Kidney stone (Primary)  -     POC Urinalysis Dipstick, Automated  -     CT Abdomen Pelvis Stone Protocol; Future  -     ciprofloxacin  (Cipro) 500 MG tablet; Take 1 tablet by mouth 2 (Two) Times a Day for 4 days.  Dispense: 8 tablet; Refill: 0    37-year-old female status post right URS/LL on Tuesday with Dr. Hdz here for right flank pain and concerns of blood in her urine.  Patient is likely having renal colic from her stent.  She is concerned because she feels like her kidney is swelling we will send her for a stat CT to rule out stone fragment causing obstruction.  We will extend her antibiotics to complete a 7-day treatment.  UA is inconclusive due to pyridium skews results.  We will have patient stop oxybutynin and try Gemtesa for bladder spasms.    Stat CT  Discontinue oxybutynin and start Gemtesa for bladder spasms x7 days  Called patient and discussed CT is negative for hydronephrosis and I consulted with Dr. Hdz patient can remove her stent at home today to try and improve her renal colic.    LEEANNA Salazar, NP-C  [facility]

## 2023-10-18 LAB
COLOR STONE: NORMAL
COM MFR STONE: 10 %
COMPN STONE: NORMAL
HYDROXYAPATITE: 90 %
LABORATORY COMMENT REPORT: NORMAL
LABORATORY COMMENT REPORT: NORMAL
Lab: NORMAL
Lab: NORMAL
PHOTO: NORMAL
SIZE STONE: NORMAL MM
SPEC SOURCE SUBJ: NORMAL
STONE ANALYSIS-IMP: NORMAL
WT STONE: 23 MG

## 2023-11-02 ENCOUNTER — TELEPHONE (OUTPATIENT)
Dept: UROLOGY | Facility: CLINIC | Age: 38
End: 2023-11-02
Payer: COMMERCIAL

## 2023-12-06 ENCOUNTER — TELEPHONE (OUTPATIENT)
Dept: UROLOGY | Facility: CLINIC | Age: 38
End: 2023-12-06
Payer: COMMERCIAL

## 2024-01-30 ENCOUNTER — TRANSCRIBE ORDERS (OUTPATIENT)
Dept: LAB | Facility: HOSPITAL | Age: 39
End: 2024-01-30
Payer: COMMERCIAL

## 2024-01-30 ENCOUNTER — LAB (OUTPATIENT)
Dept: LAB | Facility: HOSPITAL | Age: 39
End: 2024-01-30
Payer: COMMERCIAL

## 2024-01-30 DIAGNOSIS — R53.83 TIREDNESS: ICD-10-CM

## 2024-01-30 DIAGNOSIS — R10.12 ABDOMINAL PAIN, LEFT UPPER QUADRANT: Primary | ICD-10-CM

## 2024-01-30 DIAGNOSIS — E55.9 AVITAMINOSIS D: ICD-10-CM

## 2024-01-30 DIAGNOSIS — R10.12 ABDOMINAL PAIN, LEFT UPPER QUADRANT: ICD-10-CM

## 2024-01-30 LAB
BASOPHILS # BLD AUTO: 0.02 10*3/MM3 (ref 0–0.2)
BASOPHILS NFR BLD AUTO: 0.3 % (ref 0–1.5)
DEPRECATED RDW RBC AUTO: 38.4 FL (ref 37–54)
EOSINOPHIL # BLD AUTO: 0.09 10*3/MM3 (ref 0–0.4)
EOSINOPHIL NFR BLD AUTO: 1.5 % (ref 0.3–6.2)
ERYTHROCYTE [DISTWIDTH] IN BLOOD BY AUTOMATED COUNT: 12 % (ref 12.3–15.4)
HCT VFR BLD AUTO: 38.9 % (ref 34–46.6)
HGB BLD-MCNC: 12.8 G/DL (ref 12–15.9)
IMM GRANULOCYTES # BLD AUTO: 0.02 10*3/MM3 (ref 0–0.05)
IMM GRANULOCYTES NFR BLD AUTO: 0.3 % (ref 0–0.5)
LYMPHOCYTES # BLD AUTO: 1.53 10*3/MM3 (ref 0.7–3.1)
LYMPHOCYTES NFR BLD AUTO: 25.8 % (ref 19.6–45.3)
MCH RBC QN AUTO: 28.8 PG (ref 26.6–33)
MCHC RBC AUTO-ENTMCNC: 32.9 G/DL (ref 31.5–35.7)
MCV RBC AUTO: 87.4 FL (ref 79–97)
MONOCYTES # BLD AUTO: 0.3 10*3/MM3 (ref 0.1–0.9)
MONOCYTES NFR BLD AUTO: 5.1 % (ref 5–12)
NEUTROPHILS NFR BLD AUTO: 3.96 10*3/MM3 (ref 1.7–7)
NEUTROPHILS NFR BLD AUTO: 67 % (ref 42.7–76)
NRBC BLD AUTO-RTO: 0 /100 WBC (ref 0–0.2)
PLATELET # BLD AUTO: 197 10*3/MM3 (ref 140–450)
PMV BLD AUTO: 12.1 FL (ref 6–12)
RBC # BLD AUTO: 4.45 10*6/MM3 (ref 3.77–5.28)
WBC NRBC COR # BLD AUTO: 5.92 10*3/MM3 (ref 3.4–10.8)

## 2024-01-30 PROCEDURE — 82306 VITAMIN D 25 HYDROXY: CPT

## 2024-01-30 PROCEDURE — 84466 ASSAY OF TRANSFERRIN: CPT

## 2024-01-30 PROCEDURE — 80053 COMPREHEN METABOLIC PANEL: CPT

## 2024-01-30 PROCEDURE — 85025 COMPLETE CBC W/AUTO DIFF WBC: CPT

## 2024-01-30 PROCEDURE — 36415 COLL VENOUS BLD VENIPUNCTURE: CPT

## 2024-01-30 PROCEDURE — 82607 VITAMIN B-12: CPT

## 2024-01-30 PROCEDURE — 83540 ASSAY OF IRON: CPT

## 2024-01-31 DIAGNOSIS — N20.0 KIDNEY STONE: Primary | ICD-10-CM

## 2024-01-31 LAB
25(OH)D3 SERPL-MCNC: 31.6 NG/ML (ref 30–100)
ALBUMIN SERPL-MCNC: 5.1 G/DL (ref 3.5–5.2)
ALBUMIN/GLOB SERPL: 2.7 G/DL
ALP SERPL-CCNC: 55 U/L (ref 39–117)
ALT SERPL W P-5'-P-CCNC: 13 U/L (ref 1–33)
ANION GAP SERPL CALCULATED.3IONS-SCNC: 12 MMOL/L (ref 5–15)
AST SERPL-CCNC: 20 U/L (ref 1–32)
BILIRUB SERPL-MCNC: 0.5 MG/DL (ref 0–1.2)
BUN SERPL-MCNC: 14 MG/DL (ref 6–20)
BUN/CREAT SERPL: 19.2 (ref 7–25)
CALCIUM SPEC-SCNC: 9.6 MG/DL (ref 8.6–10.5)
CHLORIDE SERPL-SCNC: 104 MMOL/L (ref 98–107)
CO2 SERPL-SCNC: 26 MMOL/L (ref 22–29)
CREAT SERPL-MCNC: 0.73 MG/DL (ref 0.57–1)
EGFRCR SERPLBLD CKD-EPI 2021: 107.4 ML/MIN/1.73
GLOBULIN UR ELPH-MCNC: 1.9 GM/DL
GLUCOSE SERPL-MCNC: 87 MG/DL (ref 65–99)
IRON 24H UR-MRATE: 56 MCG/DL (ref 37–145)
IRON SATN MFR SERPL: 13 % (ref 20–50)
POTASSIUM SERPL-SCNC: 3.7 MMOL/L (ref 3.5–5.2)
PROT SERPL-MCNC: 7 G/DL (ref 6–8.5)
SODIUM SERPL-SCNC: 142 MMOL/L (ref 136–145)
TIBC SERPL-MCNC: 417 MCG/DL (ref 298–536)
TRANSFERRIN SERPL-MCNC: 280 MG/DL (ref 200–360)
VIT B12 BLD-MCNC: 275 PG/ML (ref 211–946)

## 2024-03-21 ENCOUNTER — APPOINTMENT (OUTPATIENT)
Dept: CT IMAGING | Facility: HOSPITAL | Age: 39
End: 2024-03-21
Payer: COMMERCIAL

## 2024-03-21 ENCOUNTER — HOSPITAL ENCOUNTER (EMERGENCY)
Facility: HOSPITAL | Age: 39
Discharge: HOME | End: 2024-03-21
Attending: EMERGENCY MEDICINE
Payer: COMMERCIAL

## 2024-03-21 VITALS
HEART RATE: 65 BPM | RESPIRATION RATE: 18 BRPM | DIASTOLIC BLOOD PRESSURE: 59 MMHG | HEIGHT: 63 IN | TEMPERATURE: 98 F | OXYGEN SATURATION: 100 % | BODY MASS INDEX: 20.55 KG/M2 | WEIGHT: 116 LBS | SYSTOLIC BLOOD PRESSURE: 110 MMHG

## 2024-03-21 DIAGNOSIS — R20.2 PARESTHESIA OF RIGHT LEG: Primary | ICD-10-CM

## 2024-03-21 LAB
ALBUMIN SERPL-MCNC: 5.3 G/DL (ref 3.5–5.2)
ALBUMIN/GLOB SERPL: 2 G/DL
ALP SERPL-CCNC: 72 U/L (ref 39–117)
ALT SERPL W P-5'-P-CCNC: 13 U/L (ref 1–33)
ANION GAP SERPL CALCULATED.3IONS-SCNC: 11.4 MMOL/L (ref 5–15)
AST SERPL-CCNC: 20 U/L (ref 1–32)
BASOPHILS # BLD AUTO: 0.03 10*3/MM3 (ref 0–0.2)
BASOPHILS NFR BLD AUTO: 0.4 % (ref 0–1.5)
BILIRUB SERPL-MCNC: 0.6 MG/DL (ref 0–1.2)
BUN SERPL-MCNC: 13 MG/DL (ref 6–20)
BUN/CREAT SERPL: 17.6 (ref 7–25)
CALCIUM SPEC-SCNC: 9.7 MG/DL (ref 8.6–10.5)
CHLORIDE SERPL-SCNC: 103 MMOL/L (ref 98–107)
CO2 SERPL-SCNC: 26.6 MMOL/L (ref 22–29)
CREAT SERPL-MCNC: 0.74 MG/DL (ref 0.57–1)
DEPRECATED RDW RBC AUTO: 39.3 FL (ref 37–54)
EGFRCR SERPLBLD CKD-EPI 2021: 105.7 ML/MIN/1.73
EOSINOPHIL # BLD AUTO: 0.06 10*3/MM3 (ref 0–0.4)
EOSINOPHIL NFR BLD AUTO: 0.9 % (ref 0.3–6.2)
ERYTHROCYTE [DISTWIDTH] IN BLOOD BY AUTOMATED COUNT: 12.4 % (ref 12.3–15.4)
GLOBULIN UR ELPH-MCNC: 2.6 GM/DL
GLUCOSE SERPL-MCNC: 83 MG/DL (ref 65–99)
HCT VFR BLD AUTO: 40.9 % (ref 34–46.6)
HGB BLD-MCNC: 13.8 G/DL (ref 12–15.9)
HOLD SPECIMEN: NORMAL
HOLD SPECIMEN: NORMAL
IMM GRANULOCYTES # BLD AUTO: 0.01 10*3/MM3 (ref 0–0.05)
IMM GRANULOCYTES NFR BLD AUTO: 0.1 % (ref 0–0.5)
LYMPHOCYTES # BLD AUTO: 1.82 10*3/MM3 (ref 0.7–3.1)
LYMPHOCYTES NFR BLD AUTO: 26.8 % (ref 19.6–45.3)
MAGNESIUM SERPL-MCNC: 2.2 MG/DL (ref 1.6–2.6)
MCH RBC QN AUTO: 29.3 PG (ref 26.6–33)
MCHC RBC AUTO-ENTMCNC: 33.7 G/DL (ref 31.5–35.7)
MCV RBC AUTO: 86.8 FL (ref 79–97)
MONOCYTES # BLD AUTO: 0.36 10*3/MM3 (ref 0.1–0.9)
MONOCYTES NFR BLD AUTO: 5.3 % (ref 5–12)
NEUTROPHILS NFR BLD AUTO: 4.5 10*3/MM3 (ref 1.7–7)
NEUTROPHILS NFR BLD AUTO: 66.5 % (ref 42.7–76)
NRBC BLD AUTO-RTO: 0 /100 WBC (ref 0–0.2)
PLATELET # BLD AUTO: 194 10*3/MM3 (ref 140–450)
PMV BLD AUTO: 11.7 FL (ref 6–12)
POTASSIUM SERPL-SCNC: 3.4 MMOL/L (ref 3.5–5.2)
PROT SERPL-MCNC: 7.9 G/DL (ref 6–8.5)
RBC # BLD AUTO: 4.71 10*6/MM3 (ref 3.77–5.28)
SODIUM SERPL-SCNC: 141 MMOL/L (ref 136–145)
WBC NRBC COR # BLD AUTO: 6.78 10*3/MM3 (ref 3.4–10.8)
WHOLE BLOOD HOLD COAG: NORMAL
WHOLE BLOOD HOLD SPECIMEN: NORMAL

## 2024-03-21 PROCEDURE — 70450 CT HEAD/BRAIN W/O DYE: CPT

## 2024-03-21 PROCEDURE — 83735 ASSAY OF MAGNESIUM: CPT | Performed by: PHYSICIAN ASSISTANT

## 2024-03-21 PROCEDURE — 99284 EMERGENCY DEPT VISIT MOD MDM: CPT

## 2024-03-21 PROCEDURE — 85025 COMPLETE CBC W/AUTO DIFF WBC: CPT | Performed by: PHYSICIAN ASSISTANT

## 2024-03-21 PROCEDURE — 80053 COMPREHEN METABOLIC PANEL: CPT | Performed by: PHYSICIAN ASSISTANT

## 2024-03-21 RX ORDER — SODIUM CHLORIDE 0.9 % (FLUSH) 0.9 %
10 SYRINGE (ML) INJECTION AS NEEDED
Status: DISCONTINUED | OUTPATIENT
Start: 2024-03-21 | End: 2024-03-22 | Stop reason: HOSPADM

## 2024-03-21 RX ORDER — POTASSIUM CHLORIDE 750 MG/1
40 CAPSULE, EXTENDED RELEASE ORAL ONCE
Status: DISCONTINUED | OUTPATIENT
Start: 2024-03-21 | End: 2024-03-22 | Stop reason: HOSPADM

## 2024-03-22 ENCOUNTER — TELEPHONE (OUTPATIENT)
Dept: NEUROLOGY | Facility: CLINIC | Age: 39
End: 2024-03-22
Payer: COMMERCIAL

## 2024-03-22 NOTE — DISCHARGE INSTRUCTIONS
Follow up with Neurology for further outpatient evaluation. Follow up with PCP as needed. Return to the ER for new or worsening symptoms or acute concerns.

## 2024-03-22 NOTE — TELEPHONE ENCOUNTER
PLEASE REVIEW FOR HOSPITAL FOLLOW UP VISIT.    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 PROVIDER ONLY HAS APPT IN [place] THAT MEETS PATIENT NEEDS.    PLEASE ADVISE PATIENT    THANK YOU

## 2024-03-22 NOTE — ED PROVIDER NOTES
Subjective:  Chief Complaint:  Numbness    History of Present Illness:  Patient is a 38-year-old female with history of migraines presenting to the ER with complaints of right lower extremity numbness/tingling.  Patient states that she has had the symptoms for a long time and has seen specialist but believes that she may have seen the wrong specialist.  She states she has had multiple symptoms consistent with MS.  Patient states she has seen a GI specialist as they thought she may have Crohn's and has also followed with an OB/GYN as they thought it may have been endometriosis spread to her chest.  She states she has had chest pain, difficulty swallowing, paresthesias, difficulty interpreting sentences and paragraphs which she states has been going on for years and she feels has gotten progressively worse.  She states that she would be satisfied if she at least gets a neurology referral.  Patient notes that she has had multiple episodes where she has had difficulty swallowing and thought that she was choking.  She states the symptoms at her right lower extremity including numbness and tingling have gotten worse over the last 3 days.  She states the right lower extremity symptoms are the reason she came to the ER today.  Patient also notes that she lost her sister a year ago and has been dealing with grief syndrome.  She denies additional symptoms or complaints at this time.  Patient adamant she is not pregnant.      Nurses Notes reviewed and agree, including vitals, allergies, social history and prior medical history.     REVIEW OF SYSTEMS: All systems reviewed and not pertinent unless noted.  Review of Systems   Musculoskeletal:         Numbness/tingling to RLE   All other systems reviewed and are negative.      Past Medical History:   Diagnosis Date    Migraines        Allergies:    Iodinated contrast media, Contrast dye (echo or unknown ct/mr), and Sumatriptan      Past Surgical History:   Procedure Laterality  "Date    ENDOMETRIAL BIOPSY      KIDNEY STONE SURGERY  10/2023    TONSILLECTOMY           Social History     Socioeconomic History    Marital status:    Tobacco Use    Smoking status: Never    Smokeless tobacco: Never   Vaping Use    Vaping status: Never Used   Substance and Sexual Activity    Alcohol use: Never    Drug use: Never    Sexual activity: Defer         History reviewed. No pertinent family history.    Objective  Physical Exam:  /71 (BP Location: Left arm, Patient Position: Sitting)   Pulse 71   Temp 97.2 °F (36.2 °C) (Oral)   Resp 18   Ht 160 cm (63\")   Wt 52.6 kg (116 lb)   LMP 02/21/2024 (Approximate)   SpO2 100%   BMI 20.55 kg/m²      Physical Exam  Vitals and nursing note reviewed.   Constitutional:       General: She is not in acute distress.     Appearance: She is not toxic-appearing.   HENT:      Head: Normocephalic and atraumatic.      Right Ear: External ear normal.      Left Ear: External ear normal.      Nose: Nose normal.   Eyes:      Extraocular Movements: Extraocular movements intact.      Conjunctiva/sclera: Conjunctivae normal.   Cardiovascular:      Rate and Rhythm: Normal rate.      Heart sounds: Normal heart sounds.   Pulmonary:      Effort: Pulmonary effort is normal. No respiratory distress.      Breath sounds: Normal breath sounds.   Abdominal:      General: There is no distension.   Musculoskeletal:         General: Normal range of motion.      Cervical back: Normal range of motion and neck supple.      Comments: No obvious abnormalities right lower extremity, no obvious swelling, 2+ pulses; sensation intact though she states it is altered, reports it feels like pins-and-needles   Skin:     General: Skin is warm and dry.   Neurological:      General: No focal deficit present.      Mental Status: She is alert and oriented to person, place, and time.   Psychiatric:         Mood and Affect: Mood normal.         Behavior: Behavior normal.         Procedures    ED " Course:         Lab Results (last 24 hours)       Procedure Component Value Units Date/Time    CBC & Differential [[id number]]  (Normal) Collected: 03/21/24 2038    Specimen: Blood Updated: 03/21/24 2147    Narrative:      The following orders were created for panel order CBC & Differential.  Procedure                               Abnormality         Status                     ---------                               -----------         ------                     CBC Auto Differential[[id number]]        Normal              Final result                 Please view results for these tests on the individual orders.    Comprehensive Metabolic Panel [[id number]]  (Abnormal) Collected: 03/21/24 2038    Specimen: Blood Updated: 03/21/24 2201     Glucose 83 mg/dL      BUN 13 mg/dL      Creatinine 0.74 mg/dL      Sodium 141 mmol/L      Potassium 3.4 mmol/L      Chloride 103 mmol/L      CO2 26.6 mmol/L      Calcium 9.7 mg/dL      Total Protein 7.9 g/dL      Albumin 5.3 g/dL      ALT (SGPT) 13 U/L      AST (SGOT) 20 U/L      Alkaline Phosphatase 72 U/L      Total Bilirubin 0.6 mg/dL      Globulin 2.6 gm/dL      A/G Ratio 2.0 g/dL      BUN/Creatinine Ratio 17.6     Anion Gap 11.4 mmol/L      eGFR 105.7 mL/min/1.73     Narrative:      GFR Normal >60  Chronic Kidney Disease <60  Kidney Failure <15      Magnesium [[id number]]  (Normal) Collected: 03/21/24 2038    Specimen: Blood Updated: 03/21/24 2201     Magnesium 2.2 mg/dL     CBC Auto Differential [[id number]]  (Normal) Collected: 03/21/24 2038    Specimen: Blood Updated: 03/21/24 2147     WBC 6.78 10*3/mm3      RBC 4.71 10*6/mm3      Hemoglobin 13.8 g/dL      Hematocrit 40.9 %      MCV 86.8 fL      MCH 29.3 pg      MCHC 33.7 g/dL      RDW 12.4 %      RDW-SD 39.3 fl      MPV 11.7 fL      Platelets 194 10*3/mm3      Neutrophil % 66.5 %      Lymphocyte % 26.8 %      Monocyte % 5.3 %      Eosinophil % 0.9 %      Basophil % 0.4 %      Immature Grans % 0.1 %      Neutrophils, Absolute  4.50 10*3/mm3      Lymphocytes, Absolute 1.82 10*3/mm3      Monocytes, Absolute 0.36 10*3/mm3      Eosinophils, Absolute 0.06 10*3/mm3      Basophils, Absolute 0.03 10*3/mm3      Immature Grans, Absolute 0.01 10*3/mm3      nRBC 0.0 /100 WBC              CT Head Without Contrast    Result Date: 3/21/2024  FINAL REPORT TECHNIQUE: Multiple axial CT images were performed from the foramen magnum to the vertex. This study was performed with techniques to keep radiation doses as low as reasonably achievable (ALARA). Individualized dose reduction techniques using automated exposure control or adjustment of mA and/or kV according to the patient's size were employed. CLINICAL HISTORY: paresthesias to RLE X 3 days FINDINGS: No acute intracranial hemorrhage or large acute cortical infarct.  The brain volume is normal for patient's age. Ventricles are normal in size and configuration.  No midline shift.  The basal cisterns are patent.  No skull fracture.  The visualized paranasal sinuses and mastoid air cells are clear.     Impression: No acute intracranial hemorrhage or large acute cortical infarct. Authenticated and Electronically Signed by Maverick Sanders MD on 03/21/2024 10:36:52 PM        MDM  Patient was evaluated in the ER for numbness/tingling to right lower extremity that she states has been going on for 3 days.  Patient also has multiple nonspecific complaints that have been going on for a long time now which she states she believes may be related to MS and is requesting neurology referral.  She is hemodynamically stable, afebrile, nontoxic-appearing on exam.  No focal neurologic deficits noted on exam.  Differential diagnosis includes but is not limited to electrolyte abnormality, intracranial abnormality, neurologic disorder, anxiety, among others.  Initial plan includes CBC, CMP, magnesium, CT head without contrast.    Labs unremarkable other than very mildly decreased potassium of 3.4.  Magnesium within normal  limits.  Oral potassium given in the ER.  CT unremarkable per radiology.  Patient agreeable with plan for discharge.  Ambulatory referral was placed for neurology for further outpatient evaluation.  Recommended follow-up with PCP as needed.  Precautions were given for return to the ER for any new or worsening symptoms.    Final diagnoses:   Paresthesia of right leg          Rox Washington PA-C  03/21/24 9719

## 2024-03-26 NOTE — TELEPHONE ENCOUNTER
PT IS SCHEDULED BUT CALLED TO SEE IF SHE NEEDED TO CHANGE TIME/DATE. LM FOR PT TO CALL THE OFFICE BACK.

## 2024-04-03 ENCOUNTER — LAB (OUTPATIENT)
Dept: LAB | Facility: HOSPITAL | Age: 39
End: 2024-04-03
Payer: COMMERCIAL

## 2024-04-03 ENCOUNTER — OFFICE VISIT (OUTPATIENT)
Dept: NEUROLOGY | Facility: CLINIC | Age: 39
End: 2024-04-03
Payer: COMMERCIAL

## 2024-04-03 VITALS
HEIGHT: 63 IN | DIASTOLIC BLOOD PRESSURE: 64 MMHG | OXYGEN SATURATION: 99 % | SYSTOLIC BLOOD PRESSURE: 102 MMHG | BODY MASS INDEX: 20.34 KG/M2 | TEMPERATURE: 98.4 F | HEART RATE: 97 BPM | RESPIRATION RATE: 14 BRPM | WEIGHT: 114.8 LBS

## 2024-04-03 DIAGNOSIS — H53.2 DOUBLE VISION: ICD-10-CM

## 2024-04-03 DIAGNOSIS — R20.2 PARESTHESIA: ICD-10-CM

## 2024-04-03 DIAGNOSIS — G43.001 MIGRAINE WITHOUT AURA AND WITH STATUS MIGRAINOSUS, NOT INTRACTABLE: Primary | ICD-10-CM

## 2024-04-03 DIAGNOSIS — G43.909 ACUTE MIGRAINE: ICD-10-CM

## 2024-04-03 DIAGNOSIS — R42 DIZZINESS: ICD-10-CM

## 2024-04-03 LAB
ALBUMIN SERPL-MCNC: 5 G/DL (ref 3.5–5.2)
ALBUMIN/GLOB SERPL: 2.2 G/DL
ALP SERPL-CCNC: 62 U/L (ref 39–117)
ALT SERPL W P-5'-P-CCNC: 14 U/L (ref 1–33)
ANION GAP SERPL CALCULATED.3IONS-SCNC: 10 MMOL/L (ref 5–15)
AST SERPL-CCNC: 18 U/L (ref 1–32)
BASOPHILS # BLD AUTO: 0.03 10*3/MM3 (ref 0–0.2)
BASOPHILS NFR BLD AUTO: 0.7 % (ref 0–1.5)
BILIRUB SERPL-MCNC: 0.7 MG/DL (ref 0–1.2)
BUN SERPL-MCNC: 16 MG/DL (ref 6–20)
BUN/CREAT SERPL: 14.3 (ref 7–25)
CALCIUM SPEC-SCNC: 9.5 MG/DL (ref 8.6–10.5)
CHLORIDE SERPL-SCNC: 107 MMOL/L (ref 98–107)
CO2 SERPL-SCNC: 24 MMOL/L (ref 22–29)
CREAT SERPL-MCNC: 1.12 MG/DL (ref 0.57–1)
CRP SERPL-MCNC: <0.3 MG/DL (ref 0–0.5)
DEPRECATED RDW RBC AUTO: 42.4 FL (ref 37–54)
EGFRCR SERPLBLD CKD-EPI 2021: 64.3 ML/MIN/1.73
EOSINOPHIL # BLD AUTO: 0.05 10*3/MM3 (ref 0–0.4)
EOSINOPHIL NFR BLD AUTO: 1.1 % (ref 0.3–6.2)
ERYTHROCYTE [DISTWIDTH] IN BLOOD BY AUTOMATED COUNT: 12.8 % (ref 12.3–15.4)
ERYTHROCYTE [SEDIMENTATION RATE] IN BLOOD: 3 MM/HR (ref 0–20)
FERRITIN SERPL-MCNC: 26.2 NG/ML (ref 13–150)
FOLATE SERPL-MCNC: 14.1 NG/ML (ref 4.78–24.2)
GLOBULIN UR ELPH-MCNC: 2.3 GM/DL
GLUCOSE SERPL-MCNC: 92 MG/DL (ref 65–99)
HBA1C MFR BLD: 4.9 % (ref 4.8–5.6)
HCT VFR BLD AUTO: 40.1 % (ref 34–46.6)
HGB BLD-MCNC: 13 G/DL (ref 12–15.9)
IMM GRANULOCYTES # BLD AUTO: 0.01 10*3/MM3 (ref 0–0.05)
IMM GRANULOCYTES NFR BLD AUTO: 0.2 % (ref 0–0.5)
IRON 24H UR-MRATE: 139 MCG/DL (ref 37–145)
IRON SATN MFR SERPL: 32 % (ref 20–50)
LYMPHOCYTES # BLD AUTO: 1.39 10*3/MM3 (ref 0.7–3.1)
LYMPHOCYTES NFR BLD AUTO: 31.7 % (ref 19.6–45.3)
MCH RBC QN AUTO: 28.8 PG (ref 26.6–33)
MCHC RBC AUTO-ENTMCNC: 32.4 G/DL (ref 31.5–35.7)
MCV RBC AUTO: 88.9 FL (ref 79–97)
MONOCYTES # BLD AUTO: 0.28 10*3/MM3 (ref 0.1–0.9)
MONOCYTES NFR BLD AUTO: 6.4 % (ref 5–12)
NEUTROPHILS NFR BLD AUTO: 2.62 10*3/MM3 (ref 1.7–7)
NEUTROPHILS NFR BLD AUTO: 59.9 % (ref 42.7–76)
NRBC BLD AUTO-RTO: 0 /100 WBC (ref 0–0.2)
PLATELET # BLD AUTO: 151 10*3/MM3 (ref 140–450)
PMV BLD AUTO: 12.5 FL (ref 6–12)
POTASSIUM SERPL-SCNC: 4.2 MMOL/L (ref 3.5–5.2)
PROT SERPL-MCNC: 7.3 G/DL (ref 6–8.5)
RBC # BLD AUTO: 4.51 10*6/MM3 (ref 3.77–5.28)
SODIUM SERPL-SCNC: 141 MMOL/L (ref 136–145)
TIBC SERPL-MCNC: 441 MCG/DL (ref 298–536)
TRANSFERRIN SERPL-MCNC: 296 MG/DL (ref 200–360)
TSH SERPL DL<=0.05 MIU/L-ACNC: 1.57 UIU/ML (ref 0.27–4.2)
VIT B12 BLD-MCNC: 227 PG/ML (ref 211–946)
WBC NRBC COR # BLD AUTO: 4.38 10*3/MM3 (ref 3.4–10.8)

## 2024-04-03 PROCEDURE — 82746 ASSAY OF FOLIC ACID SERUM: CPT

## 2024-04-03 PROCEDURE — 82728 ASSAY OF FERRITIN: CPT

## 2024-04-03 PROCEDURE — 84466 ASSAY OF TRANSFERRIN: CPT

## 2024-04-03 PROCEDURE — 86038 ANTINUCLEAR ANTIBODIES: CPT

## 2024-04-03 PROCEDURE — 83036 HEMOGLOBIN GLYCOSYLATED A1C: CPT

## 2024-04-03 PROCEDURE — 86140 C-REACTIVE PROTEIN: CPT

## 2024-04-03 PROCEDURE — 83540 ASSAY OF IRON: CPT

## 2024-04-03 PROCEDURE — 83921 ORGANIC ACID SINGLE QUANT: CPT

## 2024-04-03 PROCEDURE — 82607 VITAMIN B-12: CPT

## 2024-04-03 PROCEDURE — 85652 RBC SED RATE AUTOMATED: CPT

## 2024-04-03 PROCEDURE — 36415 COLL VENOUS BLD VENIPUNCTURE: CPT

## 2024-04-03 PROCEDURE — 80050 GENERAL HEALTH PANEL: CPT

## 2024-04-03 RX ORDER — AMITRIPTYLINE HYDROCHLORIDE 25 MG/1
25 TABLET, FILM COATED ORAL NIGHTLY
Qty: 30 TABLET | Refills: 3 | Status: SHIPPED | OUTPATIENT
Start: 2024-04-03

## 2024-04-03 NOTE — PROGRESS NOTES
"     New Patient Office Visit      Patient Name: Dwayne Saucedo  : 1985   MRN: [id number]     Referring Physician: Rox Washington PA-C    Chief Complaint:    Chief Complaint   Patient presents with   • Establish Care     Referred from ER. She is concerned about MS. She is having difficulty swallowing, numbness in legs, balance issues, weakened muscles, overall fatigue, recent fall, numbness in face. This has all slowly progressed since 2017.       History of Present Illness: Dwayne Saucedo is a 38 y.o. female who is here today to establish care with Neurology. She has never been before in Neurology. She was referred to us from ER ([facility]) to r/o MS. She has brought a check list [facility] and [facility] with MS symptoms. She co the following symptoms that wax and wane with varying severity since 2017:    Chronic fatigue since 2017  Blurry and doubled vision with unknown last vision exam  Numbness and tingling in her face, hands, legs, feet and trunk  Muscle Spasms and leg weakness with \"Zaps\" to legs and feet > 1 year   Mobility changes with feeling \"off balance\" and + falls (Going to see ENT-  for dizziness)  Generalized body pain which she thought was fibromyalgia (not officially diagnosed or being treated); pain spreads from head to limbs in ascending pattern  Brain Fog with memory and comprehension decline  Anxiety  Bladder Incontinence  Fecal Incontinence with diarrhea (working with GI- Crohn's vs Celiac)   Speech and Swallow changes where she will get choked with taking med and swallowing food and beverage (working with GI)    Did have COVID  and this exacerbated her symptoms.     Social: ; single parent. Three children (ages 8, 10,14). 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 Highschool graduate. Masters- Education. No tobacco, recreational drugs or ETOH.      [facility] Neuro: Maternal Cousin- Seizure Disorder, Mother- Dementia, Maternal Grandmother- Dementia, Sister- Dementia "     Pertinent Medical History: Endometriosis, Fibromyalgia working diagnosis, Chron's vs celiac working diagnosis, Migraines, Reactive Airway, Renal Stones     Subjective      Review of Systems:   Review of Systems   Constitutional:  Positive for fatigue.   HENT: Negative.     Eyes:  Positive for blurred vision and double vision.   Respiratory: Negative.     Cardiovascular: Negative.    Gastrointestinal: Negative.         Bowel incontinence    Endocrine: Negative.    Genitourinary:  Positive for urinary incontinence.   Musculoskeletal:  Positive for arthralgias, gait problem and myalgias.   Skin: Negative.    Allergic/Immunologic: Negative.    Neurological:  Positive for dizziness, speech difficulty, weakness, numbness and memory problem.   Hematological: Negative.    Psychiatric/Behavioral:  Positive for decreased concentration. Negative for self-injury and suicidal ideas.    All other systems reviewed and are negative.      Past Medical History:   Past Medical History:   Diagnosis Date   • Endometriosis    • Kidney stones    • Migraines        Past Surgical History:   Past Surgical History:   Procedure Laterality Date   • ENDOMETRIAL BIOPSY     • KIDNEY STONE SURGERY  10/2023   • TONSILLECTOMY         Family History: History reviewed. No pertinent family history.    Social History:   Social History     Socioeconomic History   • Marital status:    Tobacco Use   • Smoking status: Never     Passive exposure: Never   • Smokeless tobacco: Never   Vaping Use   • Vaping status: Never Used   Substance and Sexual Activity   • Alcohol use: Never   • Drug use: Never   • Sexual activity: Defer       Medications:     Current Outpatient Medications:   •  albuterol sulfate  (90 Base) MCG/ACT inhaler, Inhale 2 puffs Every 4 (Four) Hours As Needed (CHEST TIGHTNESS)., Disp: 8 g, Rfl: 0  •  amitriptyline (ELAVIL) 25 MG tablet, Take 1 tablet by mouth Every Night., Disp: 30 tablet, Rfl: 3    Allergies:   Allergies  "  Allergen Reactions   • Iodinated Contrast Media Hives   • Contrast Dye (Echo Or Unknown Ct/Mr) Hives   • Sumatriptan Other (See Comments)     Chest pain       Objective     Physical Exam:  Vital Signs:   Vitals:    04/03/24 1446   BP: 102/64   BP Location: Right arm   Patient Position: Sitting   Cuff Size: Adult   Pulse: 97   Resp: 14   Temp: 98.4 °F (36.9 °C)   TempSrc: Infrared   SpO2: 99%   Weight: 52.1 kg (114 lb 12.8 oz)   Height: 160 cm (62.99\")   PainSc:   6   PainLoc: Comment: GI, Head     Body mass index is 20.34 kg/m².     Physical Exam  Vitals and nursing note reviewed.   Constitutional:       Appearance: Normal appearance.   HENT:      Head: Normocephalic.      Nose: Nose normal.      Mouth/Throat:      Mouth: Mucous membranes are moist.      Pharynx: Oropharynx is clear.   Eyes:      Extraocular Movements: Extraocular movements intact.      Conjunctiva/sclera: Conjunctivae normal.   Musculoskeletal:         General: Normal range of motion.      Cervical back: Normal range of motion and neck supple.   Skin:     General: Skin is warm and dry.      Capillary Refill: Capillary refill takes less than 2 seconds.   Neurological:      General: No focal deficit present.      Mental Status: She is alert and oriented to person, place, and time.      Cranial Nerves: Cranial nerves 2-12 are intact.      Coordination: Romberg Test abnormal. Finger-Nose-Finger Test normal.      Gait: Gait is intact.      Deep Tendon Reflexes:      Reflex Scores:       Tricep reflexes are 2+ on the right side and 2+ on the left side.       Bicep reflexes are 2+ on the right side and 2+ on the left side.       Patellar reflexes are 2+ on the right side and 2+ on the left side.  Psychiatric:         Mood and Affect: Mood normal.         Speech: Speech normal.         Behavior: Behavior normal.         Neurologic Exam     Mental Status   Oriented to person, place, and time.   Follows 3 step commands.   Speech: speech is normal   Level " of consciousness: alert  Knowledge: good.     Cranial Nerves   Cranial nerves II through XII intact.     Motor Exam   Muscle bulk: normal  Overall muscle tone: normal  Right arm tone: normal  Left arm tone: normal  Right arm pronator drift: absent  Left arm pronator drift: absent  Right leg tone: normal  Left leg tone: normal    Strength   Right biceps: 5/5  Left biceps: 5/5  Right triceps: 5/5  Left triceps: 5/5  Right quadriceps: 5/5  Left quadriceps: 5/5  Right hamstrin/5  Left hamstrin/5    Sensory Exam   Light touch normal.   Right arm light touch: normal  Left arm light touch: normal  Right leg light touch: normal  Left leg light touch: normal  Pinprick normal.   Right leg pinprick: normal  Left leg pinprick: normal    Gait, Coordination, and Reflexes     Gait  Gait: normal    Coordination   Romberg: positive  Finger to nose coordination: normal    Tremor   Resting tremor: absent  Intention tremor: absent  Action tremor: absent    Reflexes   Right biceps: 2+  Left biceps: 2+  Right triceps: 2+  Left triceps: 2+  Right patellar: 2+  Left patellar: 2+  Right Perry: absent  Left Perry: absent         Assessment / Plan      Assessment/Plan:   Diagnoses and all orders for this visit:    1. Migraine without aura and with status migrainosus, not intractable (Primary)  -     MRI Brain Without Contrast; Future  -     amitriptyline (ELAVIL) 25 MG tablet; Take 1 tablet by mouth Every Night.  Dispense: 30 tablet; Refill: 3    2. Dizziness  -     MRI Brain Without Contrast; Future    3. Double vision  -     Ambulatory Referral to Ophthalmology    4. Acute migraine    5. Paresthesia  -     MRI Brain Without Contrast; Future  -     CBC & Differential; Future  -     Comprehensive Metabolic Panel; Future  -     TSH; Future  -     Antinuclear Antibodies () Direct; Future  -     Sedimentation Rate; Future  -     C-reactive Protein; Future  -     Vitamin B12; Future  -     Folate; Future  -     Methylmalonic Acid,  Serum; Future  -     Iron Profile; Future  -     Ferritin; Future  -     Hemoglobin A1c; Future         Follow Up:   Return in about 3 months (around 7/3/2024), or if symptoms worsen or fail to improve.    Anticipatory Guidance and Safety Reviewed  Patient Education Provided  MRI Brain WO r/o demyelinating diease - allergy to contrast dye  Ophthalmology Referral r/o optic neuritis   Labs: CBC, CMP, TSH, , Sed Rate, CRP, Vitamin B12/Folate, MMA, Iron Profile, Ferritin and A1C  Begin Amitriptyline 25 mg QHS (prevention); SE reviewed. Will proceed with treating suspected Fibromyalgia and Migraines with TCA while beginning MS r/o workup  Will consider Nurtec vs Ubrelvy for acute migraine with continues issue  Monofilament Exam.   Encouraged educational reading on Fibromyalgia and to FU with PCP PRN    RTC PRN or within 12 weeks or sooner with issues     LEEANNA Lima  [facility] Neurology and Sleep Medicine

## 2024-04-05 ENCOUNTER — PATIENT ROUNDING (BHMG ONLY) (OUTPATIENT)
Dept: NEUROLOGY | Facility: CLINIC | Age: 39
End: 2024-04-05
Payer: COMMERCIAL

## 2024-04-05 LAB — ANA SER QL: NEGATIVE

## 2024-04-11 LAB — METHYLMALONATE SERPL-SCNC: 363 NMOL/L (ref 0–378)

## 2024-04-24 ENCOUNTER — TELEPHONE (OUTPATIENT)
Dept: UROLOGY | Facility: CLINIC | Age: 39
End: 2024-04-24

## 2024-04-24 NOTE — TELEPHONE ENCOUNTER
LVM  with the patient to see if she can do a mychart video visit on 4/29. If not she will have to be rescheduled to next available. .     Hub can read.

## 2024-08-26 ENCOUNTER — TELEPHONE (OUTPATIENT)
Dept: NEUROLOGY | Facility: CLINIC | Age: 39
End: 2024-08-26

## 2024-09-19 ENCOUNTER — HOSPITAL ENCOUNTER (OUTPATIENT)
Dept: MRI IMAGING | Facility: HOSPITAL | Age: 39
Discharge: HOME | End: 2024-09-19
Admitting: NURSE PRACTITIONER
Payer: COMMERCIAL

## 2024-09-19 DIAGNOSIS — R42 DIZZINESS: ICD-10-CM

## 2024-09-19 DIAGNOSIS — R20.2 PARESTHESIA: ICD-10-CM

## 2024-09-19 DIAGNOSIS — G43.001 MIGRAINE WITHOUT AURA AND WITH STATUS MIGRAINOSUS, NOT INTRACTABLE: ICD-10-CM

## 2024-09-19 PROCEDURE — 70551 MRI BRAIN STEM W/O DYE: CPT

## 2025-04-22 ENCOUNTER — TELEPHONE (OUTPATIENT)
Dept: NEUROLOGY | Facility: CLINIC | Age: 40
End: 2025-04-22

## 2025-04-22 NOTE — TELEPHONE ENCOUNTER
Provider: LEEANNA DANIELS    Caller: Dwayne Saucedo    Relationship to Patient: Self      Phone Number: Mobile phone: [phone]    Reason for Call: PATIENT WAS WONDERING IF THEY NEED ANOTHER EYE EXAM BEFORE THEIR APPT. PT HAD AN EYE EXAM WITH [facility] IN [place], BUT THEY DID NOT PERFORM A DILATION AS THEY HAD BEEN INSTRUCTED TO DO.     When was the patient last seen: 10/03/24    OKAY TO RESPOND THROUGH [facility]    PLEASE REVIEW AND ADVISE

## 2025-04-23 NOTE — TELEPHONE ENCOUNTER
Called and spoke with Dwayne, she said that the office note in 4/2024 is the most recent. I told her we would let her know if she needed to schedule a new eye exam before her follow-up. She also went to see ENT- I have requested these office notes as well.

## 2025-07-01 ENCOUNTER — TRANSCRIBE ORDERS (OUTPATIENT)
Dept: ADMINISTRATIVE | Facility: HOSPITAL | Age: 40
End: 2025-07-01
Payer: COMMERCIAL

## 2025-07-01 DIAGNOSIS — Z12.31 ENCOUNTER FOR SCREENING MAMMOGRAM FOR MALIGNANT NEOPLASM OF BREAST: Primary | ICD-10-CM

## 2025-07-23 ENCOUNTER — LAB (OUTPATIENT)
Dept: LAB | Facility: HOSPITAL | Age: 40
End: 2025-07-23
Payer: COMMERCIAL

## 2025-07-23 ENCOUNTER — TRANSCRIBE ORDERS (OUTPATIENT)
Dept: LAB | Facility: HOSPITAL | Age: 40
End: 2025-07-23
Payer: COMMERCIAL

## 2025-07-23 DIAGNOSIS — R94.6 NONSPECIFIC ABNORMAL RESULTS OF FUNCTION STUDY OF THYROID: ICD-10-CM

## 2025-07-23 DIAGNOSIS — E53.8 VITAMIN B12 DEFICIENCY (NON ANEMIC): ICD-10-CM

## 2025-07-23 DIAGNOSIS — E53.9 DEFICIENCY OF VITAMIN B: ICD-10-CM

## 2025-07-23 DIAGNOSIS — D50.0 IRON DEFICIENCY ANEMIA SECONDARY TO BLOOD LOSS (CHRONIC): ICD-10-CM

## 2025-07-23 DIAGNOSIS — Z00.00 GENERAL MEDICAL EXAM: Primary | ICD-10-CM

## 2025-07-23 DIAGNOSIS — Z00.00 GENERAL MEDICAL EXAM: ICD-10-CM

## 2025-07-23 LAB
25(OH)D3 SERPL-MCNC: 35 NG/ML (ref 30–100)
ALBUMIN SERPL-MCNC: 4.4 G/DL (ref 3.5–5.2)
ALBUMIN/GLOB SERPL: 1.7 G/DL
ALP SERPL-CCNC: 54 U/L (ref 39–117)
ALT SERPL W P-5'-P-CCNC: 12 U/L (ref 1–33)
ANION GAP SERPL CALCULATED.3IONS-SCNC: 10.8 MMOL/L (ref 5–15)
AST SERPL-CCNC: 19 U/L (ref 1–32)
BILIRUB SERPL-MCNC: 0.4 MG/DL (ref 0–1.2)
BUN SERPL-MCNC: 15 MG/DL (ref 6–20)
BUN/CREAT SERPL: 16.7 (ref 7–25)
CALCIUM SPEC-SCNC: 9.7 MG/DL (ref 8.6–10.5)
CHLORIDE SERPL-SCNC: 106 MMOL/L (ref 98–107)
CO2 SERPL-SCNC: 24.2 MMOL/L (ref 22–29)
CREAT SERPL-MCNC: 0.9 MG/DL (ref 0.57–1)
DEPRECATED RDW RBC AUTO: 38.8 FL (ref 37–54)
EGFRCR SERPLBLD CKD-EPI 2021: 83.1 ML/MIN/1.73
ERYTHROCYTE [DISTWIDTH] IN BLOOD BY AUTOMATED COUNT: 12.2 % (ref 12.3–15.4)
FERRITIN SERPL-MCNC: 20 NG/ML (ref 13–150)
GLOBULIN UR ELPH-MCNC: 2.6 GM/DL
GLUCOSE SERPL-MCNC: 95 MG/DL (ref 65–99)
HCT VFR BLD AUTO: 37.4 % (ref 34–46.6)
HGB BLD-MCNC: 12.5 G/DL (ref 12–15.9)
IRON 24H UR-MRATE: 85 MCG/DL (ref 37–145)
IRON SATN MFR SERPL: 20 % (ref 20–50)
MCH RBC QN AUTO: 29.8 PG (ref 26.6–33)
MCHC RBC AUTO-ENTMCNC: 33.4 G/DL (ref 31.5–35.7)
MCV RBC AUTO: 89 FL (ref 79–97)
PLATELET # BLD AUTO: 139 10*3/MM3 (ref 140–450)
PMV BLD AUTO: 11.4 FL (ref 6–12)
POTASSIUM SERPL-SCNC: 4 MMOL/L (ref 3.5–5.2)
PROT SERPL-MCNC: 7 G/DL (ref 6–8.5)
RBC # BLD AUTO: 4.2 10*6/MM3 (ref 3.77–5.28)
SODIUM SERPL-SCNC: 141 MMOL/L (ref 136–145)
T4 FREE SERPL-MCNC: 1.16 NG/DL (ref 0.92–1.68)
TIBC SERPL-MCNC: 428 MCG/DL (ref 298–536)
TRANSFERRIN SERPL-MCNC: 287 MG/DL (ref 200–360)
TSH SERPL DL<=0.05 MIU/L-ACNC: 3.48 UIU/ML (ref 0.27–4.2)
VIT B12 BLD-MCNC: 265 PG/ML (ref 211–946)
WBC NRBC COR # BLD AUTO: 4.48 10*3/MM3 (ref 3.4–10.8)

## 2025-07-23 PROCEDURE — 36415 COLL VENOUS BLD VENIPUNCTURE: CPT

## 2025-07-23 PROCEDURE — 80050 GENERAL HEALTH PANEL: CPT

## 2025-07-23 PROCEDURE — 84466 ASSAY OF TRANSFERRIN: CPT

## 2025-07-23 PROCEDURE — 82306 VITAMIN D 25 HYDROXY: CPT

## 2025-07-23 PROCEDURE — 82728 ASSAY OF FERRITIN: CPT

## 2025-07-23 PROCEDURE — 82607 VITAMIN B-12: CPT

## 2025-07-23 PROCEDURE — 84439 ASSAY OF FREE THYROXINE: CPT

## 2025-07-23 PROCEDURE — 83540 ASSAY OF IRON: CPT

## 2025-07-31 ENCOUNTER — OFFICE VISIT (OUTPATIENT)
Dept: NEUROLOGY | Facility: CLINIC | Age: 40
End: 2025-07-31
Payer: COMMERCIAL

## 2025-07-31 ENCOUNTER — LAB (OUTPATIENT)
Dept: LAB | Facility: HOSPITAL | Age: 40
End: 2025-07-31
Payer: COMMERCIAL

## 2025-07-31 VITALS
HEART RATE: 101 BPM | SYSTOLIC BLOOD PRESSURE: 108 MMHG | HEIGHT: 63 IN | OXYGEN SATURATION: 98 % | WEIGHT: 119.5 LBS | DIASTOLIC BLOOD PRESSURE: 66 MMHG | TEMPERATURE: 99 F | BODY MASS INDEX: 21.17 KG/M2 | RESPIRATION RATE: 14 BRPM

## 2025-07-31 DIAGNOSIS — G43.001 MIGRAINE WITHOUT AURA AND WITH STATUS MIGRAINOSUS, NOT INTRACTABLE: ICD-10-CM

## 2025-07-31 DIAGNOSIS — R20.2 PARESTHESIA AND PAIN OF LEFT EXTREMITY: Primary | ICD-10-CM

## 2025-07-31 DIAGNOSIS — G43.909 ACUTE MIGRAINE: ICD-10-CM

## 2025-07-31 DIAGNOSIS — H53.2 DOUBLE VISION: ICD-10-CM

## 2025-07-31 DIAGNOSIS — R20.2 PARESTHESIA AND PAIN OF LEFT EXTREMITY: ICD-10-CM

## 2025-07-31 DIAGNOSIS — M79.609 PARESTHESIA AND PAIN OF LEFT EXTREMITY: ICD-10-CM

## 2025-07-31 DIAGNOSIS — M79.609 PARESTHESIA AND PAIN OF LEFT EXTREMITY: Primary | ICD-10-CM

## 2025-07-31 LAB — FOLATE SERPL-MCNC: 9.06 NG/ML (ref 4.78–24.2)

## 2025-07-31 PROCEDURE — 83921 ORGANIC ACID SINGLE QUANT: CPT

## 2025-07-31 PROCEDURE — 36415 COLL VENOUS BLD VENIPUNCTURE: CPT

## 2025-07-31 PROCEDURE — 86038 ANTINUCLEAR ANTIBODIES: CPT

## 2025-07-31 PROCEDURE — 82746 ASSAY OF FOLIC ACID SERUM: CPT

## 2025-07-31 PROCEDURE — 83036 HEMOGLOBIN GLYCOSYLATED A1C: CPT

## 2025-07-31 RX ORDER — TIZANIDINE 2 MG/1
2 TABLET ORAL EVERY 8 HOURS PRN
COMMUNITY
Start: 2025-07-23

## 2025-07-31 NOTE — PROGRESS NOTES
"     Follow Up Office Visit      Patient Name: Dwayne Saucedo  : 1985   MRN: [id number]     Chief Complaint:    Chief Complaint   Patient presents with    Follow-up     Migraine; pt reports  HA days  Paresthesia; pt reports that her numbness and tingling has worsened.   Double vision       History of Present Illness: Dwayne Saucedo is a 39 y.o. female who is here today to follow up with neurology for migraine and MS r/o. She was last seen in clinic  ([facility]). She would like to disucss symptoms of paresthesia today.     MDM . She reports LOPEZ are not an issue for her today. She was taking Amitriptyline 25 mg QHS for HA prevention and did this with good compliance and tolerance up until about 6 weeks ago. She decided the medication wasn't helping much and she has been using PRN 1-2 times QHS. She does have +PMH of Fibromyalgia and is not being treated for this; PCP gave her a muscle relaxer which she has not used. 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     Continues to have occasional blurry vision per baseline; recent vision exam ([place]). Symptoms have been mild.     Today she co paresthesia that has worsened in the past 4 weeks. She co left hand to elbow paresthesia with numbness and tingling sensation that waxes and wanes but has become more constant. Onset > 2 years ago. Does have some mild symptoms to RUE. She is right hand dominate. She denies changes to fine motor skills or dropping items. She has to shake hand awake sometimes. No weakness.     She also co LLE paresthesia that is \"static electricity\" sensation that is in her LLE from knee to ankle. Onset > 2 years ago. She does have some generalized mild LBP but does not think this is related. Sometimes sees chiropractor. No weakness in her legs. No BB incontinence or saddle anesthesia. She denies sciatic pain.     No chemotherapy, radiation or ETOH.    She has recent labs with PCP " ([facility]).   TSH (07/23/2025 10:03)   Vitamin D 25 Hydroxy (07/23/2025 10:03)   Iron Profile (07/23/2025 10:03)   Ferritin (07/23/2025 10:03)   Vitamin B12 (07/23/2025 10:03)     Recent Imaging:    MRI Brain WO 09/24 - noncontributory MRI Brain Without Contrast (09/19/2024 19:18)   CT Head WO 03/24- noncontributory     Pertinent Medical History:  Endometriosis, Fibromyalgia working diagnosis, Chron's vs celiac working diagnosis, Migraines, Reactive Airway, Renal Stones     Subjective      Review of Systems:   Review of Systems   Constitutional: Negative.    HENT: Negative.     Eyes:  Positive for blurred vision.   Respiratory: Negative.     Cardiovascular: Negative.    Gastrointestinal: Negative.    Endocrine: Negative.    Genitourinary: Negative.    Musculoskeletal: Negative.    Skin: Negative.    Allergic/Immunologic: Negative.    Neurological:  Positive for numbness. Negative for headache.   Hematological: Negative.    Psychiatric/Behavioral: Negative.     All other systems reviewed and are negative.      I have reviewed and the following portions of the patient's history were updated as appropriate: past family history, past medical history, past social history, past surgical history and problem list.    Medications:     Current Outpatient Medications:     albuterol sulfate  (90 Base) MCG/ACT inhaler, Inhale 2 puffs Every 4 (Four) Hours As Needed (CHEST TIGHTNESS)., Disp: 8 g, Rfl: 0    tiZANidine (ZANAFLEX) 2 MG tablet, Take 1 tablet by mouth Every 8 (Eight) Hours As Needed., Disp: , Rfl:     Allergies:   Allergies   Allergen Reactions    Iodinated Contrast Media Hives    Contrast Dye (Echo Or Unknown Ct/Mr) Hives    Sumatriptan Other (See Comments)     Chest pain       Objective     Physical Exam:  Vital Signs:   Vitals:    07/31/25 1510   BP: 108/66   BP Location: Left arm   Patient Position: Sitting   Cuff Size: Adult   Pulse: 101   Resp: 14   Temp: 99 °F (37.2 °C)   TempSrc: Temporal   SpO2: 98%  "  Weight: 54.2 kg (119 lb 8 oz)   Height: 160 cm (63\")   PainSc: 2    PainLoc: Arm  Comment: left     Body mass index is 21.17 kg/m².    Physical Exam  Vitals and nursing note reviewed.   Constitutional:       General: She is not in acute distress.     Appearance: Normal appearance.   HENT:      Head: Normocephalic.      Nose: Nose normal.      Mouth/Throat:      Mouth: Mucous membranes are moist.      Pharynx: Oropharynx is clear.   Eyes:      General: Lids are normal.      Extraocular Movements: Extraocular movements intact.      Conjunctiva/sclera: Conjunctivae normal.      Pupils: Pupils are equal, round, and reactive to light.   Musculoskeletal:      Cervical back: Normal range of motion and neck supple.      Comments: + tinel bilaterally  - phalen bilaterally   Skin:     General: Skin is warm and dry.      Capillary Refill: Capillary refill takes less than 2 seconds.   Neurological:      General: No focal deficit present.      Mental Status: She is alert and oriented to person, place, and time.      Sensory: Sensory deficit present.      Motor: Motor strength is normal.     Coordination: Coordination abnormal.   Psychiatric:         Mood and Affect: Mood normal.         Behavior: Behavior normal.         Neurological Exam  Mental Status  Alert. Oriented to person, place, and time.    Cranial Nerves  CN II: Visual acuity is normal. Visual fields full to confrontation.  CN III, IV, VI: Extraocular movements intact bilaterally. Normal lids and orbits bilaterally. Pupils equal round and reactive to light bilaterally.  CN V: Facial sensation is normal.  CN VII: Full and symmetric facial movement.  CN IX, X: Palate elevates symmetrically. Normal gag reflex.  CN XI: Shoulder shrug strength is normal.  CN XII: Tongue midline without atrophy or fasciculations.    Motor  Normal muscle bulk throughout. No fasciculations present. Normal muscle tone. No abnormal involuntary movements. Strength is 5/5 throughout all four " extremities.  No tremor.   BUE 5/5  BLE 5/5.    Sensory  Light touch is normal in upper and lower extremities.     Gait  Casual gait is normal including stance, stride, and arm swing. Able to rise from chair without using arms.       Assessment / Plan      Assessment/Plan:   Diagnoses and all orders for this visit:    1. Paresthesia and pain of left extremity (Primary)  -     Cancel: EMG & Nerve Conduction Test; Future  -     Antinuclear Antibodies () Direct; Future  -     Hemoglobin A1c; Future  -     Folate; Future  -     Methylmalonic Acid, Serum; Future  -     EMG & Nerve Conduction Test; Future    2. Double vision    3. Migraine without aura and with status migrainosus, not intractable    4. Acute migraine         Follow Up:   Return in about 3 months (around 10/31/2025), or if symptoms worsen or fail to improve.    Anticipatory Guidance and Safety Reviewed  Patient Education Provided  Reviewed MRI and discussed   Will obtain recent Opthalmology records and review r/o optic neuritis   Labs: , Folate, MMA, and A1C. Reviewed recent labs with PCP  GUSTAVO Amitriptyline- suspect self DC has increased symptoms of paresthesia in past 4-6 weeks  EMG/NCS BUE and BLE   Encouraged educational reading on Fibromyalgia and to FU with PCP PRN     RTC PRN or within 12 weeks or sooner with issues     Mirna Boykin, DNP, APRN, FNP-C  [facility]

## 2025-08-01 LAB — HBA1C MFR BLD: 5.1 % (ref 4.8–5.6)

## 2025-08-04 LAB — ANA SER QL: NEGATIVE

## 2025-08-05 LAB — METHYLMALONATE SERPL-SCNC: 249 NMOL/L (ref 0–378)
